# Patient Record
Sex: FEMALE | Race: WHITE | Employment: FULL TIME | URBAN - METROPOLITAN AREA
[De-identification: names, ages, dates, MRNs, and addresses within clinical notes are randomized per-mention and may not be internally consistent; named-entity substitution may affect disease eponyms.]

---

## 2019-10-17 ENCOUNTER — APPOINTMENT (OUTPATIENT)
Dept: CT IMAGING | Age: 34
End: 2019-10-17
Attending: STUDENT IN AN ORGANIZED HEALTH CARE EDUCATION/TRAINING PROGRAM
Payer: COMMERCIAL

## 2019-10-17 ENCOUNTER — APPOINTMENT (OUTPATIENT)
Dept: GENERAL RADIOLOGY | Age: 34
End: 2019-10-17
Attending: STUDENT IN AN ORGANIZED HEALTH CARE EDUCATION/TRAINING PROGRAM
Payer: COMMERCIAL

## 2019-10-17 ENCOUNTER — HOSPITAL ENCOUNTER (EMERGENCY)
Age: 34
Discharge: HOME | End: 2019-10-17
Attending: STUDENT IN AN ORGANIZED HEALTH CARE EDUCATION/TRAINING PROGRAM
Payer: COMMERCIAL

## 2019-10-17 VITALS
HEIGHT: 60 IN | HEART RATE: 95 BPM | RESPIRATION RATE: 18 BRPM | DIASTOLIC BLOOD PRESSURE: 68 MMHG | BODY MASS INDEX: 25.52 KG/M2 | WEIGHT: 130 LBS | TEMPERATURE: 97.8 F | SYSTOLIC BLOOD PRESSURE: 117 MMHG | OXYGEN SATURATION: 98 %

## 2019-10-17 DIAGNOSIS — J45.41 MODERATE PERSISTENT ASTHMATIC BRONCHITIS WITH ACUTE EXACERBATION: Primary | ICD-10-CM

## 2019-10-17 LAB
ALBUMIN SERPL-MCNC: 3.8 G/DL (ref 3.5–5)
ALBUMIN/GLOB SERPL: 0.9 {RATIO} (ref 1.1–2.2)
ALP SERPL-CCNC: 41 U/L (ref 45–117)
ALT SERPL-CCNC: 12 U/L (ref 12–78)
ANION GAP SERPL CALC-SCNC: 9 MMOL/L (ref 5–15)
APPEARANCE UR: CLEAR
AST SERPL-CCNC: 9 U/L (ref 15–37)
BACTERIA URNS QL MICRO: ABNORMAL /HPF
BASOPHILS # BLD: 0 K/UL (ref 0–0.1)
BASOPHILS NFR BLD: 0 % (ref 0–1)
BILIRUB SERPL-MCNC: 0.4 MG/DL (ref 0.2–1)
BILIRUB UR QL: NEGATIVE
BNP SERPL-MCNC: 27 PG/ML
BUN SERPL-MCNC: 13 MG/DL (ref 6–20)
BUN/CREAT SERPL: 17 (ref 12–20)
CALCIUM SERPL-MCNC: 8.8 MG/DL (ref 8.5–10.1)
CHLORIDE SERPL-SCNC: 108 MMOL/L (ref 97–108)
CO2 SERPL-SCNC: 22 MMOL/L (ref 21–32)
COLOR UR: ABNORMAL
COMMENT, HOLDF: NORMAL
CREAT SERPL-MCNC: 0.78 MG/DL (ref 0.55–1.02)
D DIMER PPP FEU-MCNC: <0.19 MG/L FEU (ref 0–0.65)
DIFFERENTIAL METHOD BLD: ABNORMAL
EOSINOPHIL # BLD: 0 K/UL (ref 0–0.4)
EOSINOPHIL NFR BLD: 0 % (ref 0–7)
EPITH CASTS URNS QL MICRO: ABNORMAL /LPF
ERYTHROCYTE [DISTWIDTH] IN BLOOD BY AUTOMATED COUNT: 12.5 % (ref 11.5–14.5)
GLOBULIN SER CALC-MCNC: 4.1 G/DL (ref 2–4)
GLUCOSE SERPL-MCNC: 107 MG/DL (ref 65–100)
GLUCOSE UR STRIP.AUTO-MCNC: NEGATIVE MG/DL
HCG UR QL: NEGATIVE
HCT VFR BLD AUTO: 44.1 % (ref 35–47)
HGB BLD-MCNC: 14.9 G/DL (ref 11.5–16)
HGB UR QL STRIP: ABNORMAL
HYALINE CASTS URNS QL MICRO: ABNORMAL /LPF (ref 0–5)
IMM GRANULOCYTES # BLD AUTO: 0.1 K/UL (ref 0–0.04)
IMM GRANULOCYTES NFR BLD AUTO: 1 % (ref 0–0.5)
KETONES UR QL STRIP.AUTO: ABNORMAL MG/DL
LACTATE BLD-SCNC: 2.48 MMOL/L (ref 0.4–2)
LACTATE BLD-SCNC: 2.94 MMOL/L (ref 0.4–2)
LEUKOCYTE ESTERASE UR QL STRIP.AUTO: ABNORMAL
LYMPHOCYTES # BLD: 2.2 K/UL (ref 0.8–3.5)
LYMPHOCYTES NFR BLD: 15 % (ref 12–49)
MCH RBC QN AUTO: 29.4 PG (ref 26–34)
MCHC RBC AUTO-ENTMCNC: 33.8 G/DL (ref 30–36.5)
MCV RBC AUTO: 87.2 FL (ref 80–99)
MONOCYTES # BLD: 0.7 K/UL (ref 0–1)
MONOCYTES NFR BLD: 5 % (ref 5–13)
NEUTS SEG # BLD: 11.4 K/UL (ref 1.8–8)
NEUTS SEG NFR BLD: 79 % (ref 32–75)
NITRITE UR QL STRIP.AUTO: NEGATIVE
NRBC # BLD: 0 K/UL (ref 0–0.01)
NRBC BLD-RTO: 0 PER 100 WBC
PH UR STRIP: 7 [PH] (ref 5–8)
PLATELET # BLD AUTO: 487 K/UL (ref 150–400)
PMV BLD AUTO: 9.4 FL (ref 8.9–12.9)
POTASSIUM SERPL-SCNC: 3.1 MMOL/L (ref 3.5–5.1)
PROT SERPL-MCNC: 7.9 G/DL (ref 6.4–8.2)
PROT UR STRIP-MCNC: NEGATIVE MG/DL
RBC # BLD AUTO: 5.06 M/UL (ref 3.8–5.2)
RBC #/AREA URNS HPF: ABNORMAL /HPF (ref 0–5)
SAMPLES BEING HELD,HOLD: NORMAL
SODIUM SERPL-SCNC: 139 MMOL/L (ref 136–145)
SP GR UR REFRACTOMETRY: 1.01 (ref 1–1.03)
TROPONIN I SERPL-MCNC: <0.05 NG/ML
UA: UC IF INDICATED,UAUC: ABNORMAL
UROBILINOGEN UR QL STRIP.AUTO: 0.2 EU/DL (ref 0.2–1)
WBC # BLD AUTO: 14.4 K/UL (ref 3.6–11)
WBC URNS QL MICRO: ABNORMAL /HPF (ref 0–4)

## 2019-10-17 PROCEDURE — 71046 X-RAY EXAM CHEST 2 VIEWS: CPT

## 2019-10-17 PROCEDURE — 74011636320 HC RX REV CODE- 636/320: Performed by: RADIOLOGY

## 2019-10-17 PROCEDURE — 81025 URINE PREGNANCY TEST: CPT

## 2019-10-17 PROCEDURE — 83605 ASSAY OF LACTIC ACID: CPT

## 2019-10-17 PROCEDURE — 96366 THER/PROPH/DIAG IV INF ADDON: CPT

## 2019-10-17 PROCEDURE — 85379 FIBRIN DEGRADATION QUANT: CPT

## 2019-10-17 PROCEDURE — 99285 EMERGENCY DEPT VISIT HI MDM: CPT

## 2019-10-17 PROCEDURE — 93005 ELECTROCARDIOGRAM TRACING: CPT

## 2019-10-17 PROCEDURE — 85025 COMPLETE CBC W/AUTO DIFF WBC: CPT

## 2019-10-17 PROCEDURE — 81001 URINALYSIS AUTO W/SCOPE: CPT

## 2019-10-17 PROCEDURE — 74011000250 HC RX REV CODE- 250: Performed by: STUDENT IN AN ORGANIZED HEALTH CARE EDUCATION/TRAINING PROGRAM

## 2019-10-17 PROCEDURE — 71275 CT ANGIOGRAPHY CHEST: CPT

## 2019-10-17 PROCEDURE — 87040 BLOOD CULTURE FOR BACTERIA: CPT

## 2019-10-17 PROCEDURE — 74011250636 HC RX REV CODE- 250/636: Performed by: STUDENT IN AN ORGANIZED HEALTH CARE EDUCATION/TRAINING PROGRAM

## 2019-10-17 PROCEDURE — 77030013140 HC MSK NEB VYRM -A

## 2019-10-17 PROCEDURE — 83880 ASSAY OF NATRIURETIC PEPTIDE: CPT

## 2019-10-17 PROCEDURE — 84484 ASSAY OF TROPONIN QUANT: CPT

## 2019-10-17 PROCEDURE — 96365 THER/PROPH/DIAG IV INF INIT: CPT

## 2019-10-17 PROCEDURE — 87086 URINE CULTURE/COLONY COUNT: CPT

## 2019-10-17 PROCEDURE — 74011250637 HC RX REV CODE- 250/637: Performed by: STUDENT IN AN ORGANIZED HEALTH CARE EDUCATION/TRAINING PROGRAM

## 2019-10-17 PROCEDURE — 74011000250 HC RX REV CODE- 250

## 2019-10-17 PROCEDURE — 80053 COMPREHEN METABOLIC PANEL: CPT

## 2019-10-17 PROCEDURE — 36415 COLL VENOUS BLD VENIPUNCTURE: CPT

## 2019-10-17 PROCEDURE — 96375 TX/PRO/DX INJ NEW DRUG ADDON: CPT

## 2019-10-17 RX ORDER — IPRATROPIUM BROMIDE AND ALBUTEROL SULFATE 2.5; .5 MG/3ML; MG/3ML
3 SOLUTION RESPIRATORY (INHALATION)
Status: COMPLETED | OUTPATIENT
Start: 2019-10-17 | End: 2019-10-17

## 2019-10-17 RX ORDER — POTASSIUM CHLORIDE 7.45 MG/ML
10 INJECTION INTRAVENOUS
Status: DISCONTINUED | OUTPATIENT
Start: 2019-10-17 | End: 2019-10-17 | Stop reason: HOSPADM

## 2019-10-17 RX ORDER — IPRATROPIUM BROMIDE AND ALBUTEROL SULFATE 2.5; .5 MG/3ML; MG/3ML
SOLUTION RESPIRATORY (INHALATION)
Status: COMPLETED
Start: 2019-10-17 | End: 2019-10-17

## 2019-10-17 RX ORDER — POTASSIUM CHLORIDE 750 MG/1
20 TABLET, FILM COATED, EXTENDED RELEASE ORAL
Status: COMPLETED | OUTPATIENT
Start: 2019-10-17 | End: 2019-10-17

## 2019-10-17 RX ORDER — BENZONATATE 100 MG/1
200 CAPSULE ORAL
Qty: 30 CAP | Refills: 0 | Status: SHIPPED | OUTPATIENT
Start: 2019-10-17 | End: 2019-10-24

## 2019-10-17 RX ADMIN — METHYLPREDNISOLONE SODIUM SUCCINATE 125 MG: 125 INJECTION, POWDER, FOR SOLUTION INTRAMUSCULAR; INTRAVENOUS at 11:48

## 2019-10-17 RX ADMIN — POTASSIUM CHLORIDE 10 MEQ: 7.46 INJECTION, SOLUTION INTRAVENOUS at 12:40

## 2019-10-17 RX ADMIN — SODIUM CHLORIDE 1000 ML: 900 INJECTION, SOLUTION INTRAVENOUS at 11:25

## 2019-10-17 RX ADMIN — IPRATROPIUM BROMIDE AND ALBUTEROL SULFATE 3 ML: 2.5; .5 SOLUTION RESPIRATORY (INHALATION) at 11:47

## 2019-10-17 RX ADMIN — IPRATROPIUM BROMIDE AND ALBUTEROL SULFATE 3 ML: .5; 3 SOLUTION RESPIRATORY (INHALATION) at 11:47

## 2019-10-17 RX ADMIN — IPRATROPIUM BROMIDE AND ALBUTEROL SULFATE 3 ML: .5; 3 SOLUTION RESPIRATORY (INHALATION) at 12:07

## 2019-10-17 RX ADMIN — SODIUM CHLORIDE 1000 ML: 900 INJECTION, SOLUTION INTRAVENOUS at 12:36

## 2019-10-17 RX ADMIN — IPRATROPIUM BROMIDE AND ALBUTEROL SULFATE 3 ML: .5; 3 SOLUTION RESPIRATORY (INHALATION) at 10:16

## 2019-10-17 RX ADMIN — IOPAMIDOL 80 ML: 755 INJECTION, SOLUTION INTRAVENOUS at 12:58

## 2019-10-17 RX ADMIN — POTASSIUM CHLORIDE 20 MEQ: 750 TABLET, EXTENDED RELEASE ORAL at 13:55

## 2019-10-17 NOTE — ED NOTES
Patient given discharge instructions, and 2 Rx. Patient able to verbalize understanding. Patient ambulatory from ED to home with boyfriend.

## 2019-10-17 NOTE — ED PROVIDER NOTES
27 y.o. female with past medical history significant for asthma who presents from PCP office with chief complaint of shortness of breath. Pt complains of shortness of breath on exertion with associated cough, and subjective fever for the past 2 weeks. Pt states shortness of breath was been progressively worsening and that she was unable to go to work for the past few days due to symptoms. Pt states she was evaluated at [facility] 4 days ago, given an epi shot, and prescribed Levaquin and a medrol jenna which she has been taking with no relief. Pt states she has had to stay in bed for the past 3 days due to difficulty breathing on exertion. Pt states she tried to walk at work today, but her shortness of breath was worse so she went back to her PCP and was referred to the ED. Pt notes chest pressure with shortness of breath today. Pt states she has a rescue inhaler. 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 Pt denies hemoptysis, abdominal pain, dysuria, or leg swelling. Pt denies recent travel, or surgery. There are no other acute medical concerns at this time. Social hx: Never Smoker. . PCP: No primary care provider on file. Note written by Jj George. Lizett Jiménez, as dictated by Miquel Alberts MD 10:42 AM      The history is provided by the patient and a relative. No past medical history on file. No past surgical history on file. No family history on file.     Social History     Socioeconomic History    Marital status: Not on file     Spouse name: Not on file    Number of children: Not on file    Years of education: Not on file    Highest education level: Not on file   Occupational History    Not on file   Social Needs    Financial resource strain: Not on file    Food insecurity:     Worry: Not on file     Inability: Not on file    Transportation needs:     Medical: Not on file     Non-medical: Not on file   Tobacco Use    Smoking status: Not on file   Substance and Sexual Activity    Alcohol use: Not on file    Drug use: Not on file    Sexual activity: Not on file   Lifestyle    Physical activity:     Days per week: Not on file     Minutes per session: Not on file    Stress: Not on file   Relationships    Social connections:     Talks on phone: Not on file     Gets together: Not on file     Attends Protestant service: Not on file     Active member of club or organization: Not on file     Attends meetings of clubs or organizations: Not on file     Relationship status: Not on file    Intimate partner violence:     Fear of current or ex partner: Not on file     Emotionally abused: Not on file     Physically abused: Not on file     Forced sexual activity: Not on file   Other Topics Concern    Not on file   Social History Narrative    Not on file         ALLERGIES: Patient has no known allergies. Review of Systems   Constitutional: Positive for fever (Subjective. ). Negative for chills. HENT: Negative for sore throat. Respiratory: Positive for cough and shortness of breath. Cardiovascular: Positive for chest pain. Gastrointestinal: Negative for abdominal pain, nausea and vomiting. Genitourinary: Negative for dysuria. Musculoskeletal: Negative for back pain. Skin: Negative for rash. Neurological: Negative for syncope and headaches. Psychiatric/Behavioral: Negative for confusion. All other systems reviewed and are negative. Vitals:    10/17/19 1022   BP: (!) 135/98   Pulse: (!) 105   Resp: 30   Temp: 97.8 °F (36.6 °C)   SpO2: 100%   Weight: 59 kg (130 lb)   Height: 5' (1.524 m)            Physical Exam   Constitutional: She is oriented to person, place, and time. She appears well-developed. She appears distressed. HENT:   Head: Normocephalic and atraumatic. Eyes: Pupils are equal, round, and reactive to light. Conjunctivae and EOM are normal.   Neck: Normal range of motion. Neck supple. Cardiovascular: Normal rate, regular rhythm and normal heart sounds. No murmur heard. Pulmonary/Chest: She is in respiratory distress (Mild. ). She has no wheezes. She has no rales. Coarse breath sounds bilaterally. Abdominal: Soft. Bowel sounds are normal. She exhibits no distension. There is no tenderness. There is no rebound. Musculoskeletal: Normal range of motion. She exhibits no edema. Neurological: She is alert and oriented to person, place, and time. No cranial nerve deficit. She exhibits normal muscle tone. Coordination normal.   Skin: Skin is warm and dry. No rash noted. Psychiatric: She has a normal mood and affect. Her behavior is normal.   Nursing note and vitals reviewed. Note written by Cheryle Grego. Fabrice Schneider, as dictated by Sb Sewell MD 10:45 AM      MDM       Procedures  ED EKG interpretation:  Rhythm: normal sinus rhythm; and regular . Rate (approx.): 100; Axis: normal; ST/T wave: normal.  Note written by Sabrina Schneider, as dictated by Sb Sewell MD 10:18 AM      1:48 PM  Patient's results have been reviewed with them. Patient and/or family have verbally conveyed their understanding and agreement of the patient's signs, symptoms, diagnosis, treatment and prognosis and additionally agree to follow up as recommended or return to the Emergency Room should their condition change prior to follow-up. Discharge instructions have also been provided to the patient with some educational information regarding their diagnosis as well a list of reasons why they would want to return to the ER prior to their follow-up appointment should their condition change.

## 2019-10-17 NOTE — ED TRIAGE NOTES
Pt c/o ongoing SOB with cough x 2 weeks. +intermittent fevers. Pt appears uncomforable and is speaking in 2 word sentances. Pt brought directly to ER room for treatment. +increased RR, +fine expiratory wheezing. Duo-neb breathing treatment initiated.  [facility]en at [facility].

## 2019-10-18 LAB
ATRIAL RATE: 100 BPM
BACTERIA SPEC CULT: NORMAL
CALCULATED P AXIS, ECG09: 60 DEGREES
CALCULATED R AXIS, ECG10: 75 DEGREES
CALCULATED T AXIS, ECG11: 52 DEGREES
CC UR VC: NORMAL
DIAGNOSIS, 93000: NORMAL
P-R INTERVAL, ECG05: 114 MS
Q-T INTERVAL, ECG07: 334 MS
QRS DURATION, ECG06: 78 MS
QTC CALCULATION (BEZET), ECG08: 430 MS
SERVICE CMNT-IMP: NORMAL
VENTRICULAR RATE, ECG03: 100 BPM

## 2019-10-23 LAB
BACTERIA SPEC CULT: NORMAL
BACTERIA SPEC CULT: NORMAL
SERVICE CMNT-IMP: NORMAL
SERVICE CMNT-IMP: NORMAL

## 2019-10-28 ENCOUNTER — OFFICE VISIT (OUTPATIENT)
Dept: CARDIOLOGY CLINIC | Age: 34
End: 2019-10-28

## 2019-10-28 VITALS — HEART RATE: 120 BPM | DIASTOLIC BLOOD PRESSURE: 80 MMHG | SYSTOLIC BLOOD PRESSURE: 118 MMHG | WEIGHT: 131.4 LBS

## 2019-10-28 DIAGNOSIS — R06.02 SOB (SHORTNESS OF BREATH): ICD-10-CM

## 2019-10-28 DIAGNOSIS — R00.0 TACHYCARDIA: Primary | ICD-10-CM

## 2019-10-28 DIAGNOSIS — R00.2 PALPITATIONS: ICD-10-CM

## 2019-10-28 DIAGNOSIS — R07.9 CHEST PAIN, UNSPECIFIED TYPE: ICD-10-CM

## 2019-10-28 RX ORDER — TRIAMCINOLONE ACETONIDE 55 UG/1
2 SPRAY, METERED NASAL DAILY
COMMUNITY

## 2019-10-28 RX ORDER — LEVALBUTEROL 1.25 MG/.5ML
1.25 SOLUTION, CONCENTRATE RESPIRATORY (INHALATION)
Status: ON HOLD | COMMUNITY
End: 2021-12-07

## 2019-10-28 RX ORDER — BUDESONIDE AND FORMOTEROL FUMARATE DIHYDRATE 80; 4.5 UG/1; UG/1
2 AEROSOL RESPIRATORY (INHALATION) 2 TIMES DAILY
Status: ON HOLD | COMMUNITY
End: 2021-12-07

## 2019-10-28 RX ORDER — FAMOTIDINE 10 MG/1
40 TABLET ORAL DAILY
COMMUNITY

## 2019-10-28 RX ORDER — PANTOPRAZOLE SODIUM 40 MG/1
40 TABLET, DELAYED RELEASE ORAL 2 TIMES DAILY
COMMUNITY

## 2019-10-28 RX ORDER — NORGESTIMATE AND ETHINYL ESTRADIOL 0.25-0.035
1 KIT ORAL DAILY
COMMUNITY

## 2019-10-28 RX ORDER — ZAFIRLUKAST 20 MG/1
20 TABLET, FILM COATED ORAL 2 TIMES DAILY
COMMUNITY

## 2019-10-28 NOTE — PROGRESS NOTES
Reason for Consult: Palpitations. Referring: No primary care provider on file. HPI: Deonna Alfonso is a 27 y.o. female with past medical history significant for tonsillectomy is here for evaluation of symptoms of palpitations. Symptoms started on October 17, 2019. She is a schoolteacher and teaches first grade and was having difficulty with her speech because of shortness of breath. This has been going on for past 6 to 7 years and typically occurs when the school started in September and would last for a few days to few weeks. However this time it was unusual that and also was associated with symptoms of palpitations. Symptoms described as heart racing with minimal activity such as moving from resting position to going to bathroom more going to the kitchen. Her heart rate would jump up to 130 bpm which she recorded using a pulse ox at home. Symptoms of mild chest discomfort anterior chest wall nonradiating aching in nature has been also associated with palpitations. There is no symptoms of lightheadedness or dizziness. Initially when she was seen at the urgent care she was referred for further evaluation and the [facility] ER where she was ruled out for pulmonary embolism. Her CT scan of the chest was negative. EKG at that time demonstrated a heart rate of 100 bpm.  Her pulse ox was normal.  She thereafter followed up with her primary care physician and was thought to have possible reactive airways and was prescribed bronchodilators. She formally saw the allergy physicians today and has been provisionally diagnosed with seasonal allergy disorder and has been given anti-allergy medications along with bronchodilators. She also underwent PFTs today which were normal.  She is here today primarily because of her palpitations and chest pressure sensation. No previous cardiac history. No history of tobacco smoking. No family history of heart disease. No significant past medical history.     Only significant past surgical history is tonsillectomy. She does not smoke tobacco.    EKG from October 17, 2019 personally reviewed EKG demonstrated normal sinus rhythm 100 bpm with normal axis with normal intervals ST segment. EKG from primary care physician office from October 21, 2019 was also personally reviewed. EKG demonstrated sinus rhythm with heart rate of 94 bpm with normal axis with normal intervals. Lab results including those from October 17 in the ER as well as from the urgent care visit were also personally reviewed. Her NT proBNP is normal at 27. Troponins were negative. Plan:    1. Tachycardia: Likely reactive to her seasonal allergy with respiratory symptoms however will need to rule out if there is any organic cardiac cause. She is currently not taking any medications that could affect significant heart rate issues. Will further evaluate with an echocardiogram.  May need a 24-hour Holter monitor if symptoms does not resolve with new allergy medications which she is going to start today. 2.  Chest pressure: This is likely a sensation she is getting from tachycardia. We will continue to monitor at this time. Does not appear to be CAD related. No classic symptoms of angina.              Social History     Socioeconomic History    Marital status: Not on file     Spouse name: Not on file    Number of children: Not on file    Years of education: Not on file    Highest education level: Not on file   Occupational History    Not on file   Social Needs    Financial resource strain: Not on file    Food insecurity:     Worry: Not on file     Inability: Not on file    Transportation needs:     Medical: Not on file     Non-medical: Not on file   Tobacco Use    Smoking status: Never Smoker    Smokeless tobacco: Never Used   Substance and Sexual Activity    Alcohol use: Not on file    Drug use: Not on file    Sexual activity: Not on file   Lifestyle    Physical activity:     Days per week: Not on file     Minutes per session: Not on file    Stress: Not on file   Relationships    Social connections:     Talks on phone: Not on file     Gets together: Not on file     Attends Methodist service: Not on file     Active member of club or organization: Not on file     Attends meetings of clubs or organizations: Not on file     Relationship status: Not on file    Intimate partner violence:     Fear of current or ex partner: Not on file     Emotionally abused: Not on file     Physically abused: Not on file     Forced sexual activity: Not on file   Other Topics Concern    Not on file   Social History Narrative    Not on file         No Known Allergies              ROS:  12 point review of systems was performed.  All negative except for HPI     Physical Exam:  Visit Vitals  Wt 131 lb 6.4 oz (59.6 kg)       Gen:  Well-developed, well-nourished, in no acute distress  HEENT:  Pink conjunctivae, PERRL, hearing intact to voice, moist mucous membranes  Neck:  Supple, without masses, thyroid non-tender  Resp:  No accessory muscle use, clear breath sounds without wheezes rales or rhonchi  Card:  No murmurs, normal S1, S2 without thrills, bruits or peripheral edema  Abd:  Soft, non-tender, non-distended, normoactive bowel sounds are present, no palpable organomegaly and no detectable hernias  Lymph:  No cervical or inguinal adenopathy  Musc:  No cyanosis or clubbing  Skin:  No rashes or ulcers, skin turgor is good  Neuro:  Cranial nerves are grossly intact, no focal motor weakness, follows commands appropriately  Psych:  Good insight, oriented to person, place and time, alert     Labs:     No results found for: WBC, WBCT, WBCPOC, HGB, HGBPOC, HCT, HCTPOC, PLT, PLTPOC, MCV, MCVPOC, HGBEXT, HCTEXT, PLTEXT  No results found for: HBA1C, [id number], HGBE8, GLU, GESTF, GLUCPOC, MCACR, MCA1, MCA2, MCA3, MCAU, LDL, LDLC, DLDLP, FINA, CREAPOC, ACREA, CREA, REFC3, REFC4, [id number]   No results found for: CHOL, CHOLPOCT, HDL, LDL, LDLC, LDLCPOC, LDLCEXT, TRIGL, TGLPOCT, CHHD, CHHDX  No results found for: GPT, ALTPOC, ALT, SGOT, ASTPOC, GGT, AP, APIT, APX, CBIL, TBIL, TBILI, ALB, ALBPOC, TP, NH3, NH4, INR, INREXT, PTP, PTINR, PTEXT, PLT, PLTPOC, HCABQL, HBSAG, AFP, INREXT, PTEXT, PLTEXT  No results found for: INR, INREXT, PTMR, PTP, PT1, PT2, INREXT   No results found for: GFRNA, GFRNAPOC, GFRAA, GFRAAPOC, CREA, CREAPOC, BUN, IBUN, BUNPOC, NA, NAPOC, K, KPOCT, CL, CLPOC, CO2, CO2POC, MG, PHOS, ALBEU, PTH, PTHILT, EPO  No results found for: PSA, Booker Karin, PSA3, PSAR3, [id number], [id number], PSALT  No results found for: TSH, TSH2, TSH3, TSHP, TSHELE, TSHEXT, TT3, T3U, T3UP, FRT3, FT3, FT4, FT4P, T4, T4P, FT4T, TT7, TSHEXT   No results found for: GLU, GLUCPOC   No results found for: CPK, RCK1, RCK2, RCK3, RCK4, CKMB, CKNDX, CKND1, TROPT, TROIQ, BNPP, BNP   No results found for: BNP, BNPP, BNPPPOC, XBNPT, BNPNT   No results found for: NA, K, CL, CO2, AGAP, GLU, BUN, CREA, BUCR, GFRAA, GFRNA, CA, GFRAA   No results found for: NA, K, CL, CO2, AGAP, GLU, BUN, CREA, BUCR, GFRAA, GFRNA, CA, TBIL, TBILI, GPT, ALT, SGOT, AP, TP, ALB, GLOB, AGRAT   No results found for: HBA1C, [id number], HGBE8, [id number], [id number]      No results for input(s): CPK, CKMB, TROIQ in the last 72 hours. No lab exists for component: CKQMB, CPKMB        Problem List:     Problem List  Date Reviewed: 10/28/2019    None            Cal Bustos MD, [facility]

## 2021-06-17 ENCOUNTER — TRANSCRIBE ORDER (OUTPATIENT)
Dept: SCHEDULING | Age: 36
End: 2021-06-17

## 2021-06-17 DIAGNOSIS — R05.3 CHRONIC COUGH: Primary | ICD-10-CM

## 2021-07-01 ENCOUNTER — HOSPITAL ENCOUNTER (OUTPATIENT)
Dept: CT IMAGING | Age: 36
Discharge: HOME | End: 2021-07-01
Attending: INTERNAL MEDICINE
Payer: COMMERCIAL

## 2021-07-01 DIAGNOSIS — R05.3 CHRONIC COUGH: ICD-10-CM

## 2021-07-01 PROCEDURE — 71250 CT THORAX DX C-: CPT

## 2021-12-06 ENCOUNTER — APPOINTMENT (OUTPATIENT)
Dept: GENERAL RADIOLOGY | Age: 36
End: 2021-12-06
Attending: STUDENT IN AN ORGANIZED HEALTH CARE EDUCATION/TRAINING PROGRAM
Payer: COMMERCIAL

## 2021-12-06 ENCOUNTER — APPOINTMENT (OUTPATIENT)
Dept: CT IMAGING | Age: 36
End: 2021-12-06
Attending: EMERGENCY MEDICINE
Payer: COMMERCIAL

## 2021-12-06 ENCOUNTER — APPOINTMENT (OUTPATIENT)
Dept: GENERAL RADIOLOGY | Age: 36
End: 2021-12-06
Attending: EMERGENCY MEDICINE
Payer: COMMERCIAL

## 2021-12-06 ENCOUNTER — HOSPITAL ENCOUNTER (OUTPATIENT)
Age: 36
Setting detail: OBSERVATION
Discharge: HOME | End: 2021-12-08
Attending: EMERGENCY MEDICINE | Admitting: INTERNAL MEDICINE
Payer: COMMERCIAL

## 2021-12-06 DIAGNOSIS — J45.41 MODERATE PERSISTENT ASTHMA WITH ACUTE EXACERBATION: Primary | ICD-10-CM

## 2021-12-06 DIAGNOSIS — R06.03 RESPIRATORY DISTRESS: ICD-10-CM

## 2021-12-06 PROBLEM — J45.909 ASTHMA: Status: ACTIVE | Noted: 2021-12-06

## 2021-12-06 PROBLEM — R06.82 TACHYPNEA: Status: ACTIVE | Noted: 2021-12-06

## 2021-12-06 PROBLEM — R06.00 DYSPNEA: Status: ACTIVE | Noted: 2021-12-06

## 2021-12-06 PROBLEM — D72.829 LEUKOCYTOSIS: Status: ACTIVE | Noted: 2021-12-06

## 2021-12-06 LAB
ALBUMIN SERPL-MCNC: 3.6 G/DL (ref 3.5–5)
ALBUMIN/GLOB SERPL: 1 {RATIO} (ref 1.1–2.2)
ALP SERPL-CCNC: 42 U/L (ref 45–117)
ALT SERPL-CCNC: 15 U/L (ref 12–78)
AMPHET UR QL SCN: NEGATIVE
ANION GAP SERPL CALC-SCNC: 9 MMOL/L (ref 5–15)
APPEARANCE UR: CLEAR
AST SERPL-CCNC: 12 U/L (ref 15–37)
BACTERIA URNS QL MICRO: NEGATIVE /HPF
BARBITURATES UR QL SCN: NEGATIVE
BASOPHILS # BLD: 0 K/UL (ref 0–0.1)
BASOPHILS NFR BLD: 0 % (ref 0–1)
BENZODIAZ UR QL: NEGATIVE
BILIRUB SERPL-MCNC: 0.4 MG/DL (ref 0.2–1)
BILIRUB UR QL: NEGATIVE
BUN SERPL-MCNC: 15 MG/DL (ref 6–20)
BUN/CREAT SERPL: 17 (ref 12–20)
CALCIUM SERPL-MCNC: 9.1 MG/DL (ref 8.5–10.1)
CANNABINOIDS UR QL SCN: NEGATIVE
CHLORIDE SERPL-SCNC: 107 MMOL/L (ref 97–108)
CO2 SERPL-SCNC: 22 MMOL/L (ref 21–32)
COCAINE UR QL SCN: NEGATIVE
COLOR UR: NORMAL
COVID-19 RAPID TEST, COVR: NOT DETECTED
CREAT SERPL-MCNC: 0.86 MG/DL (ref 0.55–1.02)
DIFFERENTIAL METHOD BLD: ABNORMAL
DRUG SCRN COMMENT,DRGCM: NORMAL
EOSINOPHIL # BLD: 0 K/UL (ref 0–0.4)
EOSINOPHIL NFR BLD: 0 % (ref 0–7)
EPITH CASTS URNS QL MICRO: NORMAL /LPF
ERYTHROCYTE [DISTWIDTH] IN BLOOD BY AUTOMATED COUNT: 13.3 % (ref 11.5–14.5)
ETHANOL SERPL-MCNC: <10 MG/DL
GLOBULIN SER CALC-MCNC: 3.7 G/DL (ref 2–4)
GLUCOSE SERPL-MCNC: 117 MG/DL (ref 65–100)
GLUCOSE UR STRIP.AUTO-MCNC: NEGATIVE MG/DL
HCT VFR BLD AUTO: 42.5 % (ref 35–47)
HGB BLD-MCNC: 14.4 G/DL (ref 11.5–16)
HGB UR QL STRIP: NEGATIVE
HYALINE CASTS URNS QL MICRO: NORMAL /LPF (ref 0–5)
IMM GRANULOCYTES # BLD AUTO: 0.3 K/UL (ref 0–0.04)
IMM GRANULOCYTES NFR BLD AUTO: 2 % (ref 0–0.5)
KETONES UR QL STRIP.AUTO: NEGATIVE MG/DL
LEUKOCYTE ESTERASE UR QL STRIP.AUTO: NEGATIVE
LYMPHOCYTES # BLD: 1.4 K/UL (ref 0.8–3.5)
LYMPHOCYTES NFR BLD: 10 % (ref 12–49)
MCH RBC QN AUTO: 28.5 PG (ref 26–34)
MCHC RBC AUTO-ENTMCNC: 33.9 G/DL (ref 30–36.5)
MCV RBC AUTO: 84.2 FL (ref 80–99)
METHADONE UR QL: NEGATIVE
MONOCYTES # BLD: 0.2 K/UL (ref 0–1)
MONOCYTES NFR BLD: 1 % (ref 5–13)
NEUTS SEG # BLD: 12.7 K/UL (ref 1.8–8)
NEUTS SEG NFR BLD: 87 % (ref 32–75)
NITRITE UR QL STRIP.AUTO: NEGATIVE
NRBC # BLD: 0 K/UL (ref 0–0.01)
NRBC BLD-RTO: 0 PER 100 WBC
OPIATES UR QL: NEGATIVE
PCP UR QL: NEGATIVE
PH UR STRIP: 7.5 [PH] (ref 5–8)
PLATELET # BLD AUTO: 511 K/UL (ref 150–400)
PMV BLD AUTO: 9.6 FL (ref 8.9–12.9)
POTASSIUM SERPL-SCNC: 3.9 MMOL/L (ref 3.5–5.1)
PROT SERPL-MCNC: 7.3 G/DL (ref 6.4–8.2)
PROT UR STRIP-MCNC: NEGATIVE MG/DL
RBC # BLD AUTO: 5.05 M/UL (ref 3.8–5.2)
RBC #/AREA URNS HPF: NORMAL /HPF (ref 0–5)
SODIUM SERPL-SCNC: 138 MMOL/L (ref 136–145)
SOURCE, COVRS: NORMAL
SP GR UR REFRACTOMETRY: 1.03 (ref 1–1.03)
TROPONIN-HIGH SENSITIVITY: 8 NG/L (ref 0–51)
UROBILINOGEN UR QL STRIP.AUTO: 0.2 EU/DL (ref 0.2–1)
WBC # BLD AUTO: 14.6 K/UL (ref 3.6–11)
WBC URNS QL MICRO: NORMAL /HPF (ref 0–4)

## 2021-12-06 PROCEDURE — 71045 X-RAY EXAM CHEST 1 VIEW: CPT

## 2021-12-06 PROCEDURE — 84484 ASSAY OF TROPONIN QUANT: CPT

## 2021-12-06 PROCEDURE — G0378 HOSPITAL OBSERVATION PER HR: HCPCS

## 2021-12-06 PROCEDURE — 74011250636 HC RX REV CODE- 250/636: Performed by: INTERNAL MEDICINE

## 2021-12-06 PROCEDURE — 74011000636 HC RX REV CODE- 636: Performed by: RADIOLOGY

## 2021-12-06 PROCEDURE — 93005 ELECTROCARDIOGRAM TRACING: CPT

## 2021-12-06 PROCEDURE — 71275 CT ANGIOGRAPHY CHEST: CPT

## 2021-12-06 PROCEDURE — 99284 EMERGENCY DEPT VISIT MOD MDM: CPT

## 2021-12-06 PROCEDURE — 85025 COMPLETE CBC W/AUTO DIFF WBC: CPT

## 2021-12-06 PROCEDURE — 96374 THER/PROPH/DIAG INJ IV PUSH: CPT

## 2021-12-06 PROCEDURE — 82077 ASSAY SPEC XCP UR&BREATH IA: CPT

## 2021-12-06 PROCEDURE — 80307 DRUG TEST PRSMV CHEM ANLYZR: CPT

## 2021-12-06 PROCEDURE — 96375 TX/PRO/DX INJ NEW DRUG ADDON: CPT

## 2021-12-06 PROCEDURE — 74011250637 HC RX REV CODE- 250/637: Performed by: INTERNAL MEDICINE

## 2021-12-06 PROCEDURE — 87635 SARS-COV-2 COVID-19 AMP PRB: CPT

## 2021-12-06 PROCEDURE — 80053 COMPREHEN METABOLIC PANEL: CPT

## 2021-12-06 PROCEDURE — 87086 URINE CULTURE/COLONY COUNT: CPT

## 2021-12-06 PROCEDURE — 74011250636 HC RX REV CODE- 250/636: Performed by: EMERGENCY MEDICINE

## 2021-12-06 PROCEDURE — 96372 THER/PROPH/DIAG INJ SC/IM: CPT

## 2021-12-06 PROCEDURE — 81001 URINALYSIS AUTO W/SCOPE: CPT

## 2021-12-06 PROCEDURE — 36415 COLL VENOUS BLD VENIPUNCTURE: CPT

## 2021-12-06 PROCEDURE — 96376 TX/PRO/DX INJ SAME DRUG ADON: CPT

## 2021-12-06 PROCEDURE — 0202U NFCT DS 22 TRGT SARS-COV-2: CPT

## 2021-12-06 RX ORDER — BUDESONIDE 0.5 MG/2ML
500 INHALANT ORAL
Status: DISCONTINUED | OUTPATIENT
Start: 2021-12-06 | End: 2021-12-06

## 2021-12-06 RX ORDER — ACETAMINOPHEN 325 MG/1
650 TABLET ORAL
Status: DISCONTINUED | OUTPATIENT
Start: 2021-12-06 | End: 2021-12-08 | Stop reason: HOSPADM

## 2021-12-06 RX ORDER — SODIUM CHLORIDE 0.9 % (FLUSH) 0.9 %
5-40 SYRINGE (ML) INJECTION EVERY 8 HOURS
Status: DISCONTINUED | OUTPATIENT
Start: 2021-12-06 | End: 2021-12-08 | Stop reason: HOSPADM

## 2021-12-06 RX ORDER — BUDESONIDE AND FORMOTEROL FUMARATE DIHYDRATE 80; 4.5 UG/1; UG/1
2 AEROSOL RESPIRATORY (INHALATION)
Status: DISCONTINUED | OUTPATIENT
Start: 2021-12-06 | End: 2021-12-07

## 2021-12-06 RX ORDER — ZAFIRLUKAST 20 MG/1
20 TABLET, FILM COATED ORAL 2 TIMES DAILY
Status: DISCONTINUED | OUTPATIENT
Start: 2021-12-06 | End: 2021-12-08 | Stop reason: HOSPADM

## 2021-12-06 RX ORDER — ONDANSETRON 4 MG/1
4 TABLET, ORALLY DISINTEGRATING ORAL
Status: DISCONTINUED | OUTPATIENT
Start: 2021-12-06 | End: 2021-12-08 | Stop reason: HOSPADM

## 2021-12-06 RX ORDER — ENOXAPARIN SODIUM 100 MG/ML
40 INJECTION SUBCUTANEOUS DAILY
Status: DISCONTINUED | OUTPATIENT
Start: 2021-12-06 | End: 2021-12-08 | Stop reason: HOSPADM

## 2021-12-06 RX ORDER — SODIUM CHLORIDE 0.9 % (FLUSH) 0.9 %
5-40 SYRINGE (ML) INJECTION AS NEEDED
Status: DISCONTINUED | OUTPATIENT
Start: 2021-12-06 | End: 2021-12-08 | Stop reason: HOSPADM

## 2021-12-06 RX ORDER — ONDANSETRON 2 MG/ML
4 INJECTION INTRAMUSCULAR; INTRAVENOUS
Status: DISCONTINUED | OUTPATIENT
Start: 2021-12-06 | End: 2021-12-08 | Stop reason: HOSPADM

## 2021-12-06 RX ORDER — IPRATROPIUM BROMIDE AND ALBUTEROL SULFATE 2.5; .5 MG/3ML; MG/3ML
3 SOLUTION RESPIRATORY (INHALATION)
Status: ACTIVE | OUTPATIENT
Start: 2021-12-06 | End: 2021-12-07

## 2021-12-06 RX ORDER — ACETAMINOPHEN 650 MG/1
650 SUPPOSITORY RECTAL
Status: DISCONTINUED | OUTPATIENT
Start: 2021-12-06 | End: 2021-12-08 | Stop reason: HOSPADM

## 2021-12-06 RX ORDER — FLUTICASONE PROPIONATE 50 MCG
2 SPRAY, SUSPENSION (ML) NASAL DAILY
Status: DISCONTINUED | OUTPATIENT
Start: 2021-12-07 | End: 2021-12-08 | Stop reason: HOSPADM

## 2021-12-06 RX ORDER — ARFORMOTEROL TARTRATE 15 UG/2ML
15 SOLUTION RESPIRATORY (INHALATION)
Status: DISCONTINUED | OUTPATIENT
Start: 2021-12-06 | End: 2021-12-06

## 2021-12-06 RX ORDER — PANTOPRAZOLE SODIUM 40 MG/1
40 TABLET, DELAYED RELEASE ORAL DAILY
Status: DISCONTINUED | OUTPATIENT
Start: 2021-12-07 | End: 2021-12-08 | Stop reason: HOSPADM

## 2021-12-06 RX ORDER — POLYETHYLENE GLYCOL 3350 17 G/17G
17 POWDER, FOR SOLUTION ORAL DAILY PRN
Status: DISCONTINUED | OUTPATIENT
Start: 2021-12-06 | End: 2021-12-08 | Stop reason: HOSPADM

## 2021-12-06 RX ADMIN — SODIUM CHLORIDE 1000 ML: 9 INJECTION, SOLUTION INTRAVENOUS at 16:34

## 2021-12-06 RX ADMIN — IOPAMIDOL 80 ML: 755 INJECTION, SOLUTION INTRAVENOUS at 14:21

## 2021-12-06 RX ADMIN — METHYLPREDNISOLONE SODIUM SUCCINATE 40 MG: 40 INJECTION, POWDER, FOR SOLUTION INTRAMUSCULAR; INTRAVENOUS at 23:11

## 2021-12-06 RX ADMIN — Medication 10 ML: at 23:13

## 2021-12-06 RX ADMIN — FAMOTIDINE 20 MG: 10 INJECTION, SOLUTION INTRAVENOUS at 23:13

## 2021-12-06 RX ADMIN — BUDESONIDE AND FORMOTEROL FUMARATE DIHYDRATE 2 PUFF: 80; 4.5 AEROSOL RESPIRATORY (INHALATION) at 23:12

## 2021-12-06 RX ADMIN — ENOXAPARIN SODIUM 40 MG: 40 INJECTION SUBCUTANEOUS at 23:12

## 2021-12-06 RX ADMIN — Medication 10 ML: at 16:35

## 2021-12-06 RX ADMIN — METHYLPREDNISOLONE SODIUM SUCCINATE 125 MG: 125 INJECTION, POWDER, FOR SOLUTION INTRAMUSCULAR; INTRAVENOUS at 16:34

## 2021-12-06 NOTE — CONSULTS
[facility]     Name: Harvey Burns MRN: [id number]   : 1985 Hospital: [address] [place] [address] Dr   Date: 2021        Impression Plan   1. Asthma exacerbation  2. GERD  3. Environmental allergies               · IV solumedrol  · Pulmicort/Brovana  · Duonebs  · Continue home famotidine/PPI  · OOB into chair as much as possible  · Rapid covid pending- low suspicion           Radiology  ( personally reviewed) CTA chest: no infiltrates, no PE   ABG No results for input(s): PHI, PO2I, PCO2I in the last 72 hours. Subjective     Cc: shortness of breath    38 yo with PMHx of asthma presenting for increasng shortness of breath. Sees Dr. Yeny House as outpatient and last week received IM steroid along with steroid taper, however did not improve. Denies sick contacts. GERD is controlled and allergies have been controlled with zafir lukast. As outpatient she is managed on Symbicort 160/Spiriva and xopenex. Non-smoker. Review of Systems:  A comprehensive review of systems was negative except for that written in the HPI. Past Medical History:   Diagnosis Date    Asthma     GERD (gastroesophageal reflux disease)       No past surgical history on file. Prior to Admission medications    Medication Sig Start Date End Date Taking? Authorizing Provider   zafirlukast (ACCOLATE) 20 mg tablet Take 20 mg by mouth two (2) times a day. Provider, Historical   pantoprazole (PROTONIX) 40 mg tablet Take 40 mg by mouth daily. Provider, Historical   famotidine (PEPCID) 10 mg tablet Take 10 mg by mouth daily. Provider, Historical   norgestimate-ethinyl estradiol (ORTHO-CYCLEN, SPRINTEC) 0.25-35 mg-mcg tab Take 1 Tab by mouth daily. Provider, Historical   levalbuterol (XOPENEX) 1.25 mg/0.5 mL nebu 1.25 mg by Nebulization route. Provider, Historical   budesonide-formoterol (SYMBICORT) 80-4.5 mcg/actuation HFAA Take 2 Puffs by inhalation two (2) times a day.     Provider, Historical triamcinolone (NASACORT) 55 mcg nasal inhaler 2 Sprays daily. Provider, Historical   guaiFENesin-dextromethorphan SR (MUCINEX DM) 600-30 mg per tablet Take 1 Tab by mouth two (2) times a day. Provider, Historical   albuterol sulfate 90 mcg/actuation aepb Take 2 Puffs by inhalation every four (4) hours as needed for Cough. 10/17/19   Jody Colin MD     Current Facility-Administered Medications   Medication Dose Route Frequency    sodium chloride 0.9 % bolus infusion 1,000 mL  1,000 mL IntraVENous ONCE    albuterol-ipratropium (DUO-NEB) 2.5 MG-0.5 MG/3 ML  3 mL Nebulization NOW    methylPREDNISolone (PF) (Solu-MEDROL) injection 125 mg  125 mg IntraVENous ONCE    methylPREDNISolone (PF) (SOLU-MEDROL) injection 40 mg  40 mg IntraVENous Q6H    arformoteroL (BROVANA) neb solution 15 mcg  15 mcg Nebulization BID RT    budesonide (PULMICORT) 500 mcg/2 ml nebulizer suspension  500 mcg Nebulization BID RT    sodium chloride (NS) flush 5-40 mL  5-40 mL IntraVENous Q8H    enoxaparin (LOVENOX) injection 40 mg  40 mg SubCUTAneous DAILY    [START ON 12/7/2021] pantoprazole (PROTONIX) tablet 40 mg  40 mg Oral DAILY    [START ON 12/7/2021] triamcinolone (NASACORT AQ) 55mcg/spray nasal spray  2 Spray Both Nostrils DAILY    zafirlukast (ACCOLATE) tablet 20 mg  20 mg Oral BID     No Known Allergies   Social History     Tobacco Use    Smoking status: Never Smoker    Smokeless tobacco: Never Used   Substance Use Topics    Alcohol use: Not on file      No family history on file. Laboratory: I have personally reviewed the critical care flowsheet and labs.      Recent Labs     12/06/21  1310   WBC 14.6*   HGB 14.4   HCT 42.5   *     Recent Labs     12/06/21  1310      K 3.9      CO2 22   *   BUN 15   CREA 0.86   CA 9.1   ALB 3.6   ALT 15       Objective:     Mode Rate Tidal Volume Pressure FiO2 PEEP                    Vital Signs:     TMAX(24)      Intake/Output:   Last shift: Last 3 shifts: No intake/output data recorded. [id number] intake or output data in the 24 hours ending 12/06/21 1557  EXAM:   GENERAL:  Mildly dyspnic with hoarse voice, HEENT:  PERRL, EOMI, no alar flaring or epistaxis, oral mucosa moist without cyanosis, NECK:  no jugular vein distention, no retractions, no thyromegaly or masses, LUNGS: decreased breath sounds billaterally, no w/r/r HEART:  Regular rate and rhythm with no MGR; no edema is present, ABDOMEN:  soft with no tenderness, bowel sounds present, EXTREMITIES:  warm with no cyanosis, SKIN:  no jaundice or ecchymosis and NEUROLOGIC:  alert and oriented, grossly non-focal    Florencia Aponte MD  [facility]

## 2021-12-06 NOTE — ED TRIAGE NOTES
Cough, SOB x1 week. Saw pulmonologist, prescribed steroids and antibiotics with no relief. Hx of asthma.   Pt vaccinated for covid

## 2021-12-06 NOTE — ED PROVIDER NOTES
Ms. Millie Jean Baptiste is a 43yo female who presents to the ER with complaints of SOB. She reports that she has been SOB for the last week. She was seen six days ago by her pulmonologist and was started on a steroid taper. She had been on 30mg daily for 3 days and she just finished 20mg daily for three days. She has been using her inhaler without much improvement in her symptoms. She had a negative covid test last week as well. She has been vaccinated for covid. She reports chest tightness but no chest pain. No changes with her urine or bowel movements. No swelling in her legs. She denies any other complaints. No past medical history on file. No past surgical history on file. No family history on file. Social History     Socioeconomic History    Marital status: SINGLE     Spouse name: Not on file    Number of children: Not on file    Years of education: Not on file    Highest education level: Not on file   Occupational History    Not on file   Tobacco Use    Smoking status: Never Smoker    Smokeless tobacco: Never Used   Substance and Sexual Activity    Alcohol use: Not on file    Drug use: Not on file    Sexual activity: Not on file   Other Topics Concern    Not on file   Social History Narrative    Not on file     Social Determinants of Health     Financial Resource Strain:     Difficulty of Paying Living Expenses: Not on file   Food Insecurity:     Worried About Running Out of Food in the Last Year: Not on file    Hugo of Food in the Last Year: Not on file   Transportation Needs:     Lack of Transportation (Medical): Not on file    Lack of Transportation (Non-Medical):  Not on file   Physical Activity:     Days of Exercise per Week: Not on file    Minutes of Exercise per Session: Not on file   Stress:     Feeling of Stress : Not on file   Social Connections:     Frequency of Communication with Friends and Family: Not on file    Frequency of Social Gatherings with Friends and Family: Not on file    Attends Anabaptism Services: Not on file    Active Member of Clubs or Organizations: Not on file    Attends Club or Organization Meetings: Not on file    Marital Status: Not on file   Intimate Partner Violence:     Fear of Current or Ex-Partner: Not on file    Emotionally Abused: Not on file    Physically Abused: Not on file    Sexually Abused: Not on file   Housing Stability:     Unable to Pay for Housing in the Last Year: Not on file    Number of [place] in the Last Year: Not on file    Unstable Housing in the Last Year: Not on file         ALLERGIES: Patient has no known allergies. Review of Systems   Constitutional: Negative for chills and fever. HENT: Positive for congestion and sore throat. Negative for rhinorrhea. Respiratory: Positive for cough and shortness of breath. Cardiovascular: Negative for chest pain. Gastrointestinal: Negative for abdominal pain, diarrhea, nausea and vomiting. Genitourinary: Negative for dysuria and urgency. Musculoskeletal: Negative for arthralgias and back pain. Skin: Negative for rash. Neurological: Negative for dizziness, weakness and light-headedness. Vitals:    12/06/21 1208 12/06/21 1254 12/06/21 1307   BP:  (!) 136/92 (!) 145/129   Pulse: (!) 121 (!) 150 (!) 103   Resp:  26 16   Temp:  98.1 °F (36.7 °C) 99.5 °F (37.5 °C)   SpO2: 99% 98% 96%   Weight:  63.5 kg (140 lb)    Height:  5' (1.524 m)             Physical Exam     Vital signs reviewed. Nursing notes reviewed.     Const:  No acute distress, well developed, well nourished  Head:  Atraumatic, normocephalic  Eyes:  PERRL, conjunctiva normal, no scleral icterus  Neck:  Supple, trachea midline  Cardiovascular:  tachycardic  Resp:  Moderate resp distress, + increased work of breathing, diffuse wheezes in all lung fields, no rhonchi, no rales,  Abd:  Soft, non-tender, non-distended  MSK:  No pedal edema, normal ROM  Neuro:  Alert and oriented x3, no cranial nerve defect  Skin:  Warm, dry, intact  Psych: normal mood and affect, behavior is normal, judgement and thought content is normal          MDM  Number of Diagnoses or Management Options     Amount and/or Complexity of Data Reviewed  Clinical lab tests: ordered and reviewed  Tests in the radiology section of CPT®: ordered and reviewed  Review and summarize past medical records: yes    Patient Progress  Patient progress: stable         Procedures        Perfect Serve Consult for Admission  2:06 PM    ED Room Number: ER07/07  Patient Name and age:  Levi Bush 39 y.o.  female  Working Diagnosis:   1. Moderate persistent asthma with acute exacerbation    2. Respiratory distress        COVID-19 Suspicion:  yes  Sepsis present:  no  Reassessment needed: no  Readmission: no  Isolation Requirements:  yes  Recommended Level of Care:  telemetry  Department:[facility] ED - [phone]  Other:  Recent negative covid test, but we will re-check. Total critical care time spent exclusive of procedures:  28    Ms. Kwan Narayanan is a 45yo female who presents to the ER with complaints of SOB. She became very tachycardic with a HR in the 150s with exertion. No pneumonia or edema on xray. Her exam is consistent with asthma. She has been on steroids for 6 days, but she remains SOB. Pt.  To be evaluated for admission by the hospitalist.

## 2021-12-06 NOTE — H&P
[facility]    Hospitalist Admission Note      NAME:  Nathaly Joe   :   1985   MRN:  [id number]     PCP:  Rachel Birch NP     Date/Time of service:  2021 2:57 PM          Subjective:     CHIEF COMPLAINT: dyspnea    HISTORY OF PRESENT ILLNESS:     Ms. Jena Carepnter is a 39 y.o.  female who presented to the Emergency Department complaining of dyspnea. Worsening over many days. Has been seen by pulmonary on steroid/doxycycline course without improvement. ER finds tachypnea, but no hypoxia. Of note, she reports that she and her brother and father all developed adult onset Asthma simultaneously in 2019. We will admit her for observation. Past Medical History:   Diagnosis Date    Asthma     GERD (gastroesophageal reflux disease)         No past surgical history on file. Social History     Tobacco Use    Smoking status: Never Smoker    Smokeless tobacco: Never Used   Substance Use Topics    Alcohol use: Not on file        No family history on file. Family hx cannot be fully assessed, since the patient cannot provide information    No Known Allergies     Prior to Admission medications    Medication Sig Start Date End Date Taking? Authorizing Provider   zafirlukast (ACCOLATE) 20 mg tablet Take 20 mg by mouth two (2) times a day. Provider, Historical   pantoprazole (PROTONIX) 40 mg tablet Take 40 mg by mouth daily. Provider, Historical   famotidine (PEPCID) 10 mg tablet Take 10 mg by mouth daily. Provider, Historical   norgestimate-ethinyl estradiol (ORTHO-CYCLEN, SPRINTEC) 0.25-35 mg-mcg tab Take 1 Tab by mouth daily. Provider, Historical   levalbuterol (XOPENEX) 1.25 mg/0.5 mL nebu 1.25 mg by Nebulization route. Provider, Historical   budesonide-formoterol (SYMBICORT) 80-4.5 mcg/actuation HFAA Take 2 Puffs by inhalation two (2) times a day. Provider, Historical   triamcinolone (NASACORT) 55 mcg nasal inhaler 2 Sprays daily.     Provider, Historical   guaiFENesin-dextromethorphan SR (MUCINEX DM) 600-30 mg per tablet Take 1 Tab by mouth two (2) times a day. Provider, Historical   albuterol sulfate 90 mcg/actuation aepb Take 2 Puffs by inhalation every four (4) hours as needed for Cough.  10/17/19   Anthony Martinez MD       Review of Systems:  (bold if positive, if negative)    Gen:  Eyes:  ENT:  CVS:  Pulm:  Cough, dyspnea,GI:  :  MS:  Skin:  Psych:  Endo:  Hem:  Renal:  Neuro:        Objective:      VITALS:    Vital signs reviewed; most recent are:    Visit Vitals  BP (!) 145/129 (BP 1 Location: Left upper arm)   Pulse (!) 103   Temp 99.5 °F (37.5 °C)   Resp 16   Ht 5' (1.524 m)   Wt 63.5 kg (140 lb)   SpO2 96%   BMI 27.34 kg/m²     SpO2 Readings from Last 6 Encounters:   12/06/21 96%   10/17/19 98%        No intake or output data in the 24 hours ending 12/06/21 2047     Exam:     Physical Exam:    Gen:  Well-developed, well-nourished, in no acute distress  HEENT:  Pink conjunctivae, PERRL, hearing intact to voice, moist mucous membranes  Neck:  Supple, without masses, thyroid non-tender  Resp:  Mild accessory muscle use, clear breath sounds without wheezes rales or rhonchi  Card:  No murmurs, normal S1, S2 without thrills, bruits or peripheral edema  Abd:  Soft, non-tender, non-distended, normoactive bowel sounds are present, no mass  Lymph:  No cervical or inguinal adenopathy  Musc:  No cyanosis or clubbing  Skin:  No rashes or ulcers, skin turgor is good  Neuro:  Cranial nerves are grossly intact, no focal motor weakness, follows commands appropriately  Psych:  Good insight, oriented to person, place and time, alert     Labs:    Recent Labs     12/06/21  1310   WBC 14.6*   HGB 14.4   HCT 42.5   *     Recent Labs     12/06/21  1310      K 3.9      CO2 22   *   BUN 15   CREA 0.86   CA 9.1   ALB 3.6   TBILI 0.4   ALT 15     No results found for: GLUCPOC  No results for input(s): PH, PCO2, PO2, HCO3, FIO2 in the last 72 hours. No results for input(s): INR, INREXT in the last 72 hours. All Micro Results     Procedure Component Value Units Date/Time    CULTURE, URINE [[id number]]     Order Status: Sent Specimen: Urine from Clean catch     RESPIRATORY VIRUS PANEL W/COVID-19, PCR [[id number]]     Order Status: Sent Specimen: NASOPHARYNGEAL SWAB     COVID-19 RAPID TEST [[id number]]     Order Status: Sent           I have reviewed previous records       Assessment and Plan:      Asthma exacerbation / Tachypnea / Dyspnea - POA, moderate, unclear trigger, but I suspect GERD/aspiration vs URI vs other. I actually do not hear wheezing. For now IV solumedrol, zafirlukast, nasocort, brovana, pulmicort (for symbicort) and prn duonebs. Consult pulmonary. Check 6 minute walk. GERD (gastroesophageal reflux disease) - PPI. She is compliant with PPI, no alcohol, no late meals, and she sleeps on a wedge. Leukocytosis - Presumed due to recent steroids. Xray clear. No Abx, but check RVP and inflammatory markers. Tachycardia - A persistent issue. Now worse due to nebs. She had a negative cardiac workup for this in the past.    Telemetry reviewed:   normal sinus rhythm    Risk of deterioration: high      Total time spent with patient: 48 Minutes I personally reviewed chart, notes, data and current medications in the medical record. I have personally examined and treated the patient at bedside during this period.                  Care Plan discussed with: Patient, Nursing Staff and >50% of time spent in counseling and coordination of care    Discussed:  Care Plan       ___________________________________________________    Attending Physician: Karolyn House MD

## 2021-12-06 NOTE — PROGRESS NOTES
Reason for Admission:  Asthma                   RUR Score:    N/A- patient is on observation status. Letter reviewed with patient by phone and copy of letter will be given to RN to give to patient. Plan for utilizing home health:     Not indicated     PCP: First and Last name:  Margaret Rios NP   Name of Practice:    Are you a current patient: Yes/No: yes   Approximate date of last visit: summer 2021   Can you participate in a virtual visit with your PCP: yes                    Current Advanced Directive/Advance Care Plan: Full Code      Healthcare Decision Maker:   Len Sainikeeper, spouse - [phone]                         Transition of Care Plan:                    Initial assessment was completed with patient by phone. Charted address and contact information were verified. She lives with her  in a two story house with two steps to enter. Main living space is located on the first floor. Patient reports being independent with ADLs, drives and works full-time as a . She has no prior HH hx and owns a nebulizer. Patient's preferred pharmacy is [facility]. 1. CM will follow  2. Home when stable with family  3. Outpatient follow-up  4. Patient will arrange her own transportation at discharge    Care Management Interventions  PCP Verified by CM:  Yes  Support Systems: Spouse/Significant Other  The Plan for Transition of Care is Related to the Following Treatment Goals : Asthma  Discharge Location  Discharge Placement: Home with outpatient services     Liv Schwab LCSW

## 2021-12-07 LAB
ANION GAP SERPL CALC-SCNC: 5 MMOL/L (ref 5–15)
ATRIAL RATE: 131 BPM
B PERT DNA SPEC QL NAA+PROBE: NOT DETECTED
BACTERIA SPEC CULT: NORMAL
BORDETELLA PARAPERTUSSIS PCR, BORPAR: NOT DETECTED
BUN SERPL-MCNC: 12 MG/DL (ref 6–20)
BUN/CREAT SERPL: 18 (ref 12–20)
C PNEUM DNA SPEC QL NAA+PROBE: NOT DETECTED
CALCIUM SERPL-MCNC: 8.9 MG/DL (ref 8.5–10.1)
CALCULATED P AXIS, ECG09: 51 DEGREES
CALCULATED R AXIS, ECG10: 59 DEGREES
CALCULATED T AXIS, ECG11: 57 DEGREES
CHLORIDE SERPL-SCNC: 108 MMOL/L (ref 97–108)
CO2 SERPL-SCNC: 24 MMOL/L (ref 21–32)
CREAT SERPL-MCNC: 0.67 MG/DL (ref 0.55–1.02)
DIAGNOSIS, 93000: NORMAL
ERYTHROCYTE [DISTWIDTH] IN BLOOD BY AUTOMATED COUNT: 13.2 % (ref 11.5–14.5)
FLUAV H1 2009 PAND RNA SPEC QL NAA+PROBE: NOT DETECTED
FLUAV H1 RNA SPEC QL NAA+PROBE: NOT DETECTED
FLUAV H3 RNA SPEC QL NAA+PROBE: NOT DETECTED
FLUAV SUBTYP SPEC NAA+PROBE: NOT DETECTED
FLUBV RNA SPEC QL NAA+PROBE: NOT DETECTED
GLUCOSE SERPL-MCNC: 118 MG/DL (ref 65–100)
HADV DNA SPEC QL NAA+PROBE: NOT DETECTED
HCOV 229E RNA SPEC QL NAA+PROBE: NOT DETECTED
HCOV HKU1 RNA SPEC QL NAA+PROBE: NOT DETECTED
HCOV NL63 RNA SPEC QL NAA+PROBE: NOT DETECTED
HCOV OC43 RNA SPEC QL NAA+PROBE: NOT DETECTED
HCT VFR BLD AUTO: 39 % (ref 35–47)
HGB BLD-MCNC: 12.8 G/DL (ref 11.5–16)
HMPV RNA SPEC QL NAA+PROBE: NOT DETECTED
HPIV1 RNA SPEC QL NAA+PROBE: NOT DETECTED
HPIV2 RNA SPEC QL NAA+PROBE: NOT DETECTED
HPIV3 RNA SPEC QL NAA+PROBE: NOT DETECTED
HPIV4 RNA SPEC QL NAA+PROBE: NOT DETECTED
M PNEUMO DNA SPEC QL NAA+PROBE: NOT DETECTED
MAGNESIUM SERPL-MCNC: 2.3 MG/DL (ref 1.6–2.4)
MCH RBC QN AUTO: 28.3 PG (ref 26–34)
MCHC RBC AUTO-ENTMCNC: 32.8 G/DL (ref 30–36.5)
MCV RBC AUTO: 86.3 FL (ref 80–99)
NRBC # BLD: 0 K/UL (ref 0–0.01)
NRBC BLD-RTO: 0 PER 100 WBC
P-R INTERVAL, ECG05: 124 MS
PHOSPHATE SERPL-MCNC: 2.6 MG/DL (ref 2.6–4.7)
PLATELET # BLD AUTO: 460 K/UL (ref 150–400)
PMV BLD AUTO: 9.8 FL (ref 8.9–12.9)
POTASSIUM SERPL-SCNC: 4.2 MMOL/L (ref 3.5–5.1)
PROCALCITONIN SERPL-MCNC: <0.05 NG/ML
Q-T INTERVAL, ECG07: 286 MS
QRS DURATION, ECG06: 70 MS
QTC CALCULATION (BEZET), ECG08: 422 MS
RBC # BLD AUTO: 4.52 M/UL (ref 3.8–5.2)
RSV RNA SPEC QL NAA+PROBE: NOT DETECTED
RV+EV RNA SPEC QL NAA+PROBE: NOT DETECTED
SARS-COV-2 PCR, COVPCR: NOT DETECTED
SERVICE CMNT-IMP: NORMAL
SODIUM SERPL-SCNC: 137 MMOL/L (ref 136–145)
VENTRICULAR RATE, ECG03: 131 BPM
WBC # BLD AUTO: 12 K/UL (ref 3.6–11)

## 2021-12-07 PROCEDURE — 74011000250 HC RX REV CODE- 250: Performed by: INTERNAL MEDICINE

## 2021-12-07 PROCEDURE — 94618 PULMONARY STRESS TESTING: CPT

## 2021-12-07 PROCEDURE — 94640 AIRWAY INHALATION TREATMENT: CPT

## 2021-12-07 PROCEDURE — 74011250637 HC RX REV CODE- 250/637: Performed by: INTERNAL MEDICINE

## 2021-12-07 PROCEDURE — 84145 PROCALCITONIN (PCT): CPT

## 2021-12-07 PROCEDURE — 85027 COMPLETE CBC AUTOMATED: CPT

## 2021-12-07 PROCEDURE — 80048 BASIC METABOLIC PNL TOTAL CA: CPT

## 2021-12-07 PROCEDURE — G0378 HOSPITAL OBSERVATION PER HR: HCPCS

## 2021-12-07 PROCEDURE — 74011250636 HC RX REV CODE- 250/636: Performed by: INTERNAL MEDICINE

## 2021-12-07 PROCEDURE — 94664 DEMO&/EVAL PT USE INHALER: CPT

## 2021-12-07 PROCEDURE — 77030013140 HC MSK NEB VYRM -A

## 2021-12-07 PROCEDURE — 96376 TX/PRO/DX INJ SAME DRUG ADON: CPT

## 2021-12-07 PROCEDURE — 84100 ASSAY OF PHOSPHORUS: CPT

## 2021-12-07 PROCEDURE — 77030012341 HC CHMB SPCR OPTC MDI VYRM -A

## 2021-12-07 PROCEDURE — 36415 COLL VENOUS BLD VENIPUNCTURE: CPT

## 2021-12-07 PROCEDURE — 83735 ASSAY OF MAGNESIUM: CPT

## 2021-12-07 RX ORDER — IPRATROPIUM BROMIDE AND ALBUTEROL SULFATE 2.5; .5 MG/3ML; MG/3ML
3 SOLUTION RESPIRATORY (INHALATION)
Status: DISCONTINUED | OUTPATIENT
Start: 2021-12-07 | End: 2021-12-08 | Stop reason: HOSPADM

## 2021-12-07 RX ORDER — GUAIFENESIN 100 MG/5ML
100 SOLUTION ORAL
Status: DISCONTINUED | OUTPATIENT
Start: 2021-12-07 | End: 2021-12-07

## 2021-12-07 RX ORDER — AZELASTINE HCL 205.5 UG/1
2 SPRAY NASAL 2 TIMES DAILY
COMMUNITY

## 2021-12-07 RX ORDER — GUAIFENESIN/DEXTROMETHORPHAN 100-10MG/5
10 SYRUP ORAL EVERY 6 HOURS
Status: DISCONTINUED | OUTPATIENT
Start: 2021-12-07 | End: 2021-12-08 | Stop reason: HOSPADM

## 2021-12-07 RX ORDER — GUAIFENESIN/DEXTROMETHORPHAN 100-10MG/5
10 SYRUP ORAL EVERY 6 HOURS
Status: DISCONTINUED | OUTPATIENT
Start: 2021-12-07 | End: 2021-12-07

## 2021-12-07 RX ORDER — BUDESONIDE AND FORMOTEROL FUMARATE DIHYDRATE 160; 4.5 UG/1; UG/1
2 AEROSOL RESPIRATORY (INHALATION) 2 TIMES DAILY
COMMUNITY

## 2021-12-07 RX ORDER — CETIRIZINE HYDROCHLORIDE, PSEUDOEPHEDRINE HYDROCHLORIDE 5; 120 MG/1; MG/1
1 TABLET, FILM COATED, EXTENDED RELEASE ORAL
COMMUNITY

## 2021-12-07 RX ORDER — BENZONATATE 100 MG/1
100 CAPSULE ORAL
Status: DISCONTINUED | OUTPATIENT
Start: 2021-12-07 | End: 2021-12-08 | Stop reason: HOSPADM

## 2021-12-07 RX ADMIN — IPRATROPIUM BROMIDE AND ALBUTEROL SULFATE 3 ML: .5; 2.5 SOLUTION RESPIRATORY (INHALATION) at 21:05

## 2021-12-07 RX ADMIN — METHYLPREDNISOLONE SODIUM SUCCINATE 40 MG: 40 INJECTION, POWDER, FOR SOLUTION INTRAMUSCULAR; INTRAVENOUS at 17:06

## 2021-12-07 RX ADMIN — FAMOTIDINE 20 MG: 10 INJECTION, SOLUTION INTRAVENOUS at 21:51

## 2021-12-07 RX ADMIN — BUDESONIDE AND FORMOTEROL FUMARATE DIHYDRATE 2 PUFF: 80; 4.5 AEROSOL RESPIRATORY (INHALATION) at 07:31

## 2021-12-07 RX ADMIN — ZAFIRLUKAST 20 MG: 20 TABLET, COATED ORAL at 02:04

## 2021-12-07 RX ADMIN — GUAIFENESIN AND DEXTROMETHORPHAN 10 ML: 100; 10 SYRUP ORAL at 23:31

## 2021-12-07 RX ADMIN — IPRATROPIUM BROMIDE AND ALBUTEROL SULFATE 3 ML: .5; 2.5 SOLUTION RESPIRATORY (INHALATION) at 14:23

## 2021-12-07 RX ADMIN — BUDESONIDE AND FORMOTEROL FUMARATE DIHYDRATE 2 PUFF: 80; 4.5 AEROSOL RESPIRATORY (INHALATION) at 21:00

## 2021-12-07 RX ADMIN — METHYLPREDNISOLONE SODIUM SUCCINATE 40 MG: 40 INJECTION, POWDER, FOR SOLUTION INTRAMUSCULAR; INTRAVENOUS at 23:31

## 2021-12-07 RX ADMIN — Medication 10 ML: at 17:07

## 2021-12-07 RX ADMIN — IPRATROPIUM BROMIDE AND ALBUTEROL SULFATE 3 ML: .5; 2.5 SOLUTION RESPIRATORY (INHALATION) at 08:09

## 2021-12-07 RX ADMIN — GUAIFENESIN AND DEXTROMETHORPHAN 10 ML: 100; 10 SYRUP ORAL at 12:04

## 2021-12-07 RX ADMIN — METHYLPREDNISOLONE SODIUM SUCCINATE 40 MG: 40 INJECTION, POWDER, FOR SOLUTION INTRAMUSCULAR; INTRAVENOUS at 06:26

## 2021-12-07 RX ADMIN — GUAIFENESIN AND DEXTROMETHORPHAN 10 ML: 100; 10 SYRUP ORAL at 09:20

## 2021-12-07 RX ADMIN — ZAFIRLUKAST 20 MG: 20 TABLET, COATED ORAL at 18:04

## 2021-12-07 RX ADMIN — METHYLPREDNISOLONE SODIUM SUCCINATE 40 MG: 40 INJECTION, POWDER, FOR SOLUTION INTRAMUSCULAR; INTRAVENOUS at 12:04

## 2021-12-07 RX ADMIN — Medication 10 ML: at 06:27

## 2021-12-07 RX ADMIN — ACETAMINOPHEN 650 MG: 325 TABLET ORAL at 12:11

## 2021-12-07 RX ADMIN — GUAIFENESIN AND DEXTROMETHORPHAN 10 ML: 100; 10 SYRUP ORAL at 17:06

## 2021-12-07 RX ADMIN — ZAFIRLUKAST 20 MG: 20 TABLET, COATED ORAL at 09:20

## 2021-12-07 RX ADMIN — PANTOPRAZOLE SODIUM 40 MG: 40 TABLET, DELAYED RELEASE ORAL at 09:20

## 2021-12-07 RX ADMIN — FAMOTIDINE 20 MG: 10 INJECTION, SOLUTION INTRAVENOUS at 09:20

## 2021-12-07 RX ADMIN — Medication 10 ML: at 21:52

## 2021-12-07 NOTE — PROGRESS NOTES
[facility] [address], [place], [address] [facility]  [phone]      Medical Progress Note      NAME: Jazmin Chavira   :  1985  MRM:  [id number]    Date/Time of service: 2021  8:59 AM       Subjective:     Chief Complaint:  Patient was personally seen and examined by me during this time period. Chart reviewed. Still dyspneic, worse with exertion. Also coughing        Objective:       Vitals:       Last 24hrs VS reviewed since prior progress note.  Most recent are:    Visit Vitals  /71   Pulse 95   Temp 97.6 °F (36.4 °C)   Resp 15   Ht 5' (1.524 m)   Wt 63.5 kg (140 lb)   SpO2 97%   BMI 27.34 kg/m²     SpO2 Readings from Last 6 Encounters:   21 97%   10/17/19 98%            Intake/Output Summary (Last 24 hours) at 2021 0859  Last data filed at 2021 1830  Gross per 24 hour   Intake 1480 ml   Output 600 ml   Net 880 ml        Exam:     Physical Exam:    Gen:  Well-developed, well-nourished, in no acute distress  HEENT:  Pink conjunctivae, PERRL, hearing intact to voice, moist mucous membranes  Neck:  Supple, without masses, thyroid non-tender  Resp:  No accessory muscle use, minimal wheezing   Card:  No murmurs, normal S1, S2 without thrills, bruits or peripheral edema  Abd:  Soft, non-tender, non-distended, normoactive bowel sounds are present  Musc:  No cyanosis or clubbing  Skin:  No rashes   Neuro:  Cranial nerves 3-12 are grossly intact, follows commands appropriately  Psych:  Good insight, oriented to person, place and time, alert    Medications Reviewed: (see below)    Lab Data Reviewed: (see below)    ______________________________________________________________________    Medications:     Current Facility-Administered Medications   Medication Dose Route Frequency    albuterol-ipratropium (DUO-NEB) 2.5 MG-0.5 MG/3 ML  3 mL Nebulization Q6H RT    benzonatate (TESSALON) capsule 100 mg  100 mg Oral TID PRN    guaiFENesin-dextromethorphan (ROBITUSSIN DM) 100-10 mg/5 mL syrup 10 mL  10 mL Oral Q6H    methylPREDNISolone (PF) (SOLU-MEDROL) injection 40 mg  40 mg IntraVENous Q6H    sodium chloride (NS) flush 5-40 mL  5-40 mL IntraVENous Q8H    sodium chloride (NS) flush 5-40 mL  5-40 mL IntraVENous PRN    acetaminophen (TYLENOL) tablet 650 mg  650 mg Oral Q6H PRN    Or    acetaminophen (TYLENOL) suppository 650 mg  650 mg Rectal Q6H PRN    polyethylene glycol (MIRALAX) packet 17 g  17 g Oral DAILY PRN    ondansetron (ZOFRAN ODT) tablet 4 mg  4 mg Oral Q8H PRN    Or    ondansetron (ZOFRAN) injection 4 mg  4 mg IntraVENous Q6H PRN    enoxaparin (LOVENOX) injection 40 mg  40 mg SubCUTAneous DAILY    pantoprazole (PROTONIX) tablet 40 mg  40 mg Oral DAILY    fluticasone propionate (FLONASE) 50 mcg/actuation nasal spray 2 Spray  2 Spray Both Nostrils DAILY    zafirlukast (ACCOLATE) tablet 20 mg  20 mg Oral BID    famotidine (PF) (PEPCID) 20 mg in 0.9% sodium chloride 10 mL injection  20 mg IntraVENous Q12H    budesonide-formoterol (SYMBICORT) 80-4.5 mcg inhaler  2 Puff Inhalation BID RT     Current Outpatient Medications   Medication Sig    zafirlukast (ACCOLATE) 20 mg tablet Take 20 mg by mouth two (2) times a day.  pantoprazole (PROTONIX) 40 mg tablet Take 40 mg by mouth daily.  famotidine (PEPCID) 10 mg tablet Take 10 mg by mouth daily.  norgestimate-ethinyl estradiol (ORTHO-CYCLEN, SPRINTEC) 0.25-35 mg-mcg tab Take 1 Tab by mouth daily.  levalbuterol (XOPENEX) 1.25 mg/0.5 mL nebu 1.25 mg by Nebulization route.  budesonide-formoterol (SYMBICORT) 80-4.5 mcg/actuation HFAA Take 2 Puffs by inhalation two (2) times a day.  triamcinolone (NASACORT) 55 mcg nasal inhaler 2 Sprays daily.  guaiFENesin-dextromethorphan SR (MUCINEX DM) 600-30 mg per tablet Take 1 Tab by mouth two (2) times a day.  albuterol sulfate 90 mcg/actuation aepb Take 2 Puffs by inhalation every four (4) hours as needed for Cough.           Lab Review: Recent Labs     12/07/21  0358 12/06/21  1310   WBC 12.0* 14.6*   HGB 12.8 14.4   HCT 39.0 42.5   * 511*     Recent Labs     12/07/21  0358 12/06/21  1310    138   K 4.2 3.9    107   CO2 24 22   * 117*   BUN 12 15   CREA 0.67 0.86   CA 8.9 9.1   MG 2.3  --    PHOS 2.6  --    ALB  --  3.6   TBILI  --  0.4   ALT  --  15     No results found for: GLUCPOC       Assessment / Plan:     Principal Problem:    38 yo hx of asthma, presented w/ dyspnea, asthma exacerbation    1) Asthma exacerbation/bronchospasm/dyspnea: slow to improve. Chest CT unremarkable. Viral panel/COVID neg. Cont IV solumedrol, LABA/ICS, nebs. Schedule guaifenesin. Pulm following    2) Leukocytosis: due to steroids    3) GERD: cont PPI    4) Intermittent tachycardia: recently saw Cards. Echo normal.  Chest CT neg PE.   Will monitor     Total time spent with patient: 28 Minutes **I personally saw and examined the patient during this time period**                 Care Plan discussed with: Patient, nursing     Discussed:  Care Plan    Prophylaxis:  Lovenox    Disposition:  Home w/Family           ___________________________________________________    Attending Physician: Akshat Sprague MD

## 2021-12-07 NOTE — PROGRESS NOTES
12/07/21 1045   Resting (Room Air)   SpO2 97      During Walk (Room Air)   SpO2 99      Rate of Dyspnea 2   Symptoms Other (comment); Shortness of breath; Chest tightness   Comments pt with dry, hacking cough and increased HR. After Walk   SpO2 97      Comments walked in room. returned to bed on room air.    Does the Patient Qualify for Home O2 No

## 2021-12-07 NOTE — PROGRESS NOTES
[facility]     Name: Jose Santos MRN: [id number]   : 1985 Hospital: [address]   Date: 2021        Impression Plan   1. Asthma exacerbation  2. GERD  3. Environmental allergies               · Continue IV solumedrol  · Pulmicort/Brovana  · Add Duonebs- pt COVID 19 negative  · Continue home famotidine/PPI  · OOB into chair as much as possible           Radiology  ( personally reviewed) CTA chest: no infiltrates, no PE   ABG No results for input(s): PHI, PO2I, PCO2I in the last 72 hours. Subjective     Cc: shortness of breath    40 yo with PMHx of asthma presenting for increasng shortness of breath. Sees Dr. Milli Fine as outpatient and last week received IM steroid along with steroid taper, however did not improve. Denies sick contacts. GERD is controlled and allergies have been controlled with zafir lukast. As outpatient she is managed on Symbicort 160/Spiriva and xopenex. Non-smoker. Overnight events:   Feels ok as long as she is sitting still. 100% on RA. Past Medical History:   Diagnosis Date    Asthma     GERD (gastroesophageal reflux disease)       No past surgical history on file. Prior to Admission medications    Medication Sig Start Date End Date Taking? Authorizing Provider   zafirlukast (ACCOLATE) 20 mg tablet Take 20 mg by mouth two (2) times a day. Provider, Historical   pantoprazole (PROTONIX) 40 mg tablet Take 40 mg by mouth daily. Provider, Historical   famotidine (PEPCID) 10 mg tablet Take 10 mg by mouth daily. Provider, Historical   norgestimate-ethinyl estradiol (ORTHO-CYCLEN, SPRINTEC) 0.25-35 mg-mcg tab Take 1 Tab by mouth daily. Provider, Historical   levalbuterol (XOPENEX) 1.25 mg/0.5 mL nebu 1.25 mg by Nebulization route. Provider, Historical   budesonide-formoterol (SYMBICORT) 80-4.5 mcg/actuation HFAA Take 2 Puffs by inhalation two (2) times a day.     Provider, Historical   triamcinolone (NASACORT) 55 mcg nasal inhaler 2 Sprays daily. Provider, Historical   guaiFENesin-dextromethorphan SR (MUCINEX DM) 600-30 mg per tablet Take 1 Tab by mouth two (2) times a day. Provider, Historical   albuterol sulfate 90 mcg/actuation aepb Take 2 Puffs by inhalation every four (4) hours as needed for Cough. 10/17/19   Ulysses Kappa, MD     Current Facility-Administered Medications   Medication Dose Route Frequency    albuterol-ipratropium (DUO-NEB) 2.5 MG-0.5 MG/3 ML  3 mL Nebulization Q6H RT    methylPREDNISolone (PF) (SOLU-MEDROL) injection 40 mg  40 mg IntraVENous Q6H    sodium chloride (NS) flush 5-40 mL  5-40 mL IntraVENous Q8H    enoxaparin (LOVENOX) injection 40 mg  40 mg SubCUTAneous DAILY    pantoprazole (PROTONIX) tablet 40 mg  40 mg Oral DAILY    fluticasone propionate (FLONASE) 50 mcg/actuation nasal spray 2 Spray  2 Spray Both Nostrils DAILY    zafirlukast (ACCOLATE) tablet 20 mg  20 mg Oral BID    famotidine (PF) (PEPCID) 20 mg in 0.9% sodium chloride 10 mL injection  20 mg IntraVENous Q12H    budesonide-formoterol (SYMBICORT) 80-4.5 mcg inhaler  2 Puff Inhalation BID RT     Allergies   Allergen Reactions    Latex Other (comments)     rash    Galactose-Alpha-1,3-Galactose (Alpha-Gal) Anaphylaxis     Hives, gi upset    Phenazopyridine Other (comments)     Low bp, tacycardic, asthmatic flare    Sulfamethoxazole-Trimethoprim Other (comments)     Low bp, thacycardic, asthmatic flare      Social History     Tobacco Use    Smoking status: Never Smoker    Smokeless tobacco: Never Used   Substance Use Topics    Alcohol use: Not on file      No family history on file. Laboratory: I have personally reviewed the critical care flowsheet and labs.      Recent Labs     12/07/21  0358 12/06/21  1310   WBC 12.0* 14.6*   HGB 12.8 14.4   HCT 39.0 42.5   * 511*     Recent Labs     12/07/21  0358 12/06/21  1310    138   K 4.2 3.9    107   CO2 24 22   * 117*   BUN 12 15   CREA 0.67 0.86   CA 8.9 9.1   MG 2.3  --    PHOS 2.6  --    ALB  --  3.6   ALT  --  15       Objective:     Mode Rate Tidal Volume Pressure FiO2 PEEP                    Vital Signs:     TMAX(24)      Intake/Output:   Last shift:         Last 3 shifts: No intake/output data recorded. [id number]    Intake/Output Summary (Last 24 hours) at 12/7/2021 0829  Last data filed at 12/6/2021 1830  Gross per 24 hour   Intake 1480 ml   Output 600 ml   Net 880 ml     EXAM:   GENERAL: Awake,alert, hoarse voice, HEENT:  PERRL, EOMI, no alar flaring or epistaxis, oral mucosa moist without cyanosis, NECK:  no jugular vein distention, no retractions, no thyromegaly or masses, LUNGS: CTA, no w/r/r, no w/r/r HEART:  Regular rate and rhythm with no MGR; no edema is present, ABDOMEN:  soft with no tenderness, bowel sounds present, EXTREMITIES:  warm with no cyanosis, SKIN:  no jaundice or ecchymosis and NEUROLOGIC:  alert and oriented, grossly non-focal    Nyeal MD [facility]  [facility] [place]

## 2021-12-08 VITALS
WEIGHT: 142.2 LBS | TEMPERATURE: 98.2 F | OXYGEN SATURATION: 97 % | HEART RATE: 98 BPM | BODY MASS INDEX: 27.92 KG/M2 | RESPIRATION RATE: 18 BRPM | SYSTOLIC BLOOD PRESSURE: 133 MMHG | HEIGHT: 60 IN | DIASTOLIC BLOOD PRESSURE: 75 MMHG

## 2021-12-08 PROCEDURE — 74011250637 HC RX REV CODE- 250/637: Performed by: INTERNAL MEDICINE

## 2021-12-08 PROCEDURE — 74011000250 HC RX REV CODE- 250: Performed by: INTERNAL MEDICINE

## 2021-12-08 PROCEDURE — 74011250636 HC RX REV CODE- 250/636: Performed by: INTERNAL MEDICINE

## 2021-12-08 PROCEDURE — 96376 TX/PRO/DX INJ SAME DRUG ADON: CPT

## 2021-12-08 PROCEDURE — G0378 HOSPITAL OBSERVATION PER HR: HCPCS

## 2021-12-08 PROCEDURE — 94640 AIRWAY INHALATION TREATMENT: CPT

## 2021-12-08 RX ORDER — BENZONATATE 100 MG/1
100 CAPSULE ORAL
Qty: 21 CAPSULE | Refills: 0 | Status: SHIPPED | OUTPATIENT
Start: 2021-12-08 | End: 2021-12-15

## 2021-12-08 RX ORDER — PREDNISONE 10 MG/1
TABLET ORAL
Qty: 21 TABLET | Refills: 0 | Status: ON HOLD | OUTPATIENT
Start: 2021-12-08 | End: 2022-01-24 | Stop reason: SDUPTHER

## 2021-12-08 RX ORDER — IPRATROPIUM BROMIDE AND ALBUTEROL SULFATE 2.5; .5 MG/3ML; MG/3ML
3 SOLUTION RESPIRATORY (INHALATION)
Qty: 30 NEBULE | Refills: 1 | Status: SHIPPED | OUTPATIENT
Start: 2021-12-08

## 2021-12-08 RX ADMIN — METHYLPREDNISOLONE SODIUM SUCCINATE 40 MG: 40 INJECTION, POWDER, FOR SOLUTION INTRAMUSCULAR; INTRAVENOUS at 05:48

## 2021-12-08 RX ADMIN — FLUTICASONE PROPIONATE 2 SPRAY: 50 SPRAY, METERED NASAL at 10:21

## 2021-12-08 RX ADMIN — IPRATROPIUM BROMIDE AND ALBUTEROL SULFATE 3 ML: .5; 2.5 SOLUTION RESPIRATORY (INHALATION) at 08:21

## 2021-12-08 RX ADMIN — Medication 10 ML: at 05:48

## 2021-12-08 RX ADMIN — ARFORMOTEROL TARTRATE: 15 SOLUTION RESPIRATORY (INHALATION) at 08:21

## 2021-12-08 RX ADMIN — METHYLPREDNISOLONE SODIUM SUCCINATE 40 MG: 40 INJECTION, POWDER, FOR SOLUTION INTRAMUSCULAR; INTRAVENOUS at 12:14

## 2021-12-08 RX ADMIN — FAMOTIDINE 20 MG: 10 INJECTION, SOLUTION INTRAVENOUS at 10:20

## 2021-12-08 RX ADMIN — GUAIFENESIN AND DEXTROMETHORPHAN 10 ML: 100; 10 SYRUP ORAL at 12:14

## 2021-12-08 RX ADMIN — IPRATROPIUM BROMIDE AND ALBUTEROL SULFATE 3 ML: .5; 2.5 SOLUTION RESPIRATORY (INHALATION) at 01:30

## 2021-12-08 RX ADMIN — GUAIFENESIN AND DEXTROMETHORPHAN 10 ML: 100; 10 SYRUP ORAL at 05:48

## 2021-12-08 RX ADMIN — ZAFIRLUKAST 20 MG: 20 TABLET, COATED ORAL at 10:20

## 2021-12-08 RX ADMIN — ACETAMINOPHEN 650 MG: 325 TABLET ORAL at 10:30

## 2021-12-08 RX ADMIN — PANTOPRAZOLE SODIUM 40 MG: 40 TABLET, DELAYED RELEASE ORAL at 10:20

## 2021-12-08 NOTE — PROGRESS NOTES
Hospital Follow Up. PCP Nikky Naylor NP Nurse will Contact Patient directly for Follow Up Appointment. Requesting that Discharge Summary and Medication List be Faxed to [phone].  Will Forward to JULIETA

## 2021-12-08 NOTE — PROGRESS NOTES
12/8/2021  Case Management Progress Note    12:00 PM  Patient is 39year old female admitted 12/6 with asthma exacerbation   Patient does not have RUR score due to observation status, letter given on admission  Covid test: negative 12/6  Chart reviewed--patient discussed at IDR rounds  Noted patient has a discharge order today. She did not qualify for home oxygen and does not have any noted needs from ; plan is for her to return home with family assistance. OK for discharge from  standpoint. Transition of Care Plan   1. Discharge today, order in   2. Home with family assistance  3. Family will transport  4. Follow up with PCP, specialties as needed  5.  OK for discharge from [facility], MSW

## 2021-12-08 NOTE — PROGRESS NOTES
I have reviewed discharge instructions with the patient and spouse. The patient and spouse verbalized understanding. Discharge medications reviewed with patient and spouse and appropriate educational materials and side effects teaching were provided. AVS signed and placed in patient chart.

## 2021-12-08 NOTE — DISCHARGE INSTRUCTIONS
HOSPITALIST DISCHARGE INSTRUCTIONS  NAME: Ismael Hernández   :  1985   MRN:  [id number]     Date/Time:  2021 11:29 AM    ADMIT DATE: 2021     DISCHARGE DATE: 2021     ADMITTING DIAGNOSIS:  Asthma attack, viral infection. COVID negative     DISCHARGE DIAGNOSIS:  same    MEDICATIONS:  See after visit summary       · It is important that you take the medication exactly as they are prescribed. · Keep your medication in the bottles provided by the pharmacist and keep a list of the medication names, dosages, and times to be taken in your wallet. · Do not take other medications without consulting your doctor     Pain Management: per above medications    What to do at Home    Recommended diet:  Regular Diet    Recommended activity: Activity as tolerated    1) Return to the hospital if you feel worse    2) If you experience any of the following symptoms then please call your primary care physician or return to the emergency room if you cannot get hold of your doctor:  Fever, chills, nausea, vomiting, diarrhea, change in mentation, falling, bleeding, shortness of breath, chest pain, severe headache, severe abdominal pain,     3) Follow up with your doctors     Follow Up: Follow-up Information     Follow up With Specialties Details Why Contact Info    Makenna Palacios NP Nurse Practitioner Schedule an appointment as soon as possible for a visit in 5 days  [address]      Tomeka Alcantara MD Pulmonary Disease Schedule an appointment as soon as possible for a visit in 2 weeks  [address]  [facility]  [phone]              Information obtained by :  I understand that if any problems occur once I am at home I am to contact my physician. I understand and acknowledge receipt of the instructions indicated above. Physician's or R.N.'s Signature                                                                  Date/Time                                                                                                                                              Patient or Representative Signature                                                          Date/Time      Patient Education        Asthma Attack: Care Instructions  Your Care Instructions     During an asthma attack, the airways swell and narrow. This makes it hard to breathe. Severe asthma attacks can be life-threatening, but you can help prevent them by keeping your asthma under control and treating symptoms before they get bad. Symptoms include being short of breath, having chest tightness, coughing, and wheezing. Noting and treating these symptoms can also help you avoid future trips to the emergency room. The doctor has checked you carefully, but problems can develop later. If you notice any problems or new symptoms, get medical treatment right away. Follow-up care is a key part of your treatment and safety. Be sure to make and go to all appointments, and call your doctor if you are having problems. It's also a good idea to know your test results and keep a list of the medicines you take. How can you care for yourself at home? · Follow your asthma action plan to prevent and treat attacks. If you don't have an asthma action plan, work with your doctor to create one. · Take your asthma medicines exactly as prescribed. Talk to your doctor right away if you have any questions about how to take them. ? Use your quick-relief medicine when you have symptoms of an attack. Quick-relief medicine is usually an albuterol inhaler. Some people need to use quick-relief medicine before they exercise. ? Take your controller medicine every day, not just when you have symptoms. Controller medicine is usually an inhaled corticosteroid.  The goal is to prevent problems before they occur. Don't use your controller medicine to treat an attack that has already started. It doesn't work fast enough to help. ? If your doctor prescribed corticosteroid pills to use during an attack, take them exactly as prescribed. It may take hours for the pills to work, but they may make the episode shorter and help you breathe better. ? Keep your quick-relief medicine with you at all times. · Talk to your doctor before using other medicines. Some medicines, such as aspirin, can cause asthma attacks in some people. · If you have a peak flow meter, use it to check how well you are breathing. This can help you predict when an asthma attack is going to occur. Then you can take medicine to prevent the asthma attack or make it less severe. · Do not smoke or allow others to smoke around you. Avoid smoky places. Smoking makes asthma worse. If you need help quitting, talk to your doctor about stop-smoking programs and medicines. These can increase your chances of quitting for good. · Learn what triggers an asthma attack for you, and avoid the triggers when you can. Common triggers include colds, smoke, air pollution, dust, pollen, mold, pets, cockroaches, stress, and cold air. · Avoid colds and the flu. Get a pneumococcal vaccine shot. If you have had one before, ask your doctor if you need a second dose. Get a flu vaccine every fall. If you must be around people with colds or the flu, wash your hands often. When should you call for help? [id number] anytime you think you may need emergency care. For example, call if:  · You have severe trouble breathing. Call your doctor now or seek immediate medical care if:  · Your symptoms do not get better after you have followed your asthma action plan. · You have new or worse trouble breathing. · Your coughing and wheezing get worse. · You cough up dark brown or bloody mucus (sputum). · You have a new or higher fever.   Watch closely for changes in your health, and be sure to contact your doctor if:  · You need to use quick-relief medicine on more than 2 days a week (unless it is just for exercise). · You cough more deeply or more often, especially if you notice more mucus or a change in the color of your mucus. · You are not getting better as expected. Where can you learn more? Go to http://www.grey.com/  Enter U128 in the search box to learn more about \"Asthma Attack: Care Instructions. \"  Current as of: February 24, 2020               Content Version: 12.5  © 9409-2220 Healthwise, [employer]. Care instructions adapted under license by [employer] (which disclaims liability or warranty for this information). If you have questions about a medical condition or this instruction, always ask your healthcare professional. [address] any warranty or liability for your use of this information.

## 2021-12-08 NOTE — PROGRESS NOTES
Bedside shift change report given to Nia Pugh RN (oncoming nurse) by Yumi Álvarez RN (offgoing nurse). Report included the following information SBAR, ED Summary and Recent Results.

## 2021-12-08 NOTE — PROGRESS NOTES
[address] December 8, 2021       RE: Ismael Hernández      To Whom It May Concern,    This is to certify that Ismael Hernández was under my care at [facility] from 12/06 to 12/08. Please excuse Ms. Bergeron from work for 1 week    Please feel free to contact my office if you have any questions or concerns. Thank you for your assistance in this matter.       Sincerely,  Oneyda Ronquillo MD   [phone]

## 2021-12-08 NOTE — DISCHARGE SUMMARY
Sha [facility]endezelsen [facility]  [address] [facility], [address] [place] [address]  [phone]    Physician Discharge Summary     Patient ID:  Shelby Ferraro  [id number]  89 y.o.  1985    Admit date: 12/6/2021    Discharge date and time: 12/8/2021 11:30 AM    Admission Diagnoses: Asthma [J45.909]    Discharge Diagnoses:  Principal Diagnosis Asthma exacerbation                                            Principal Problem:    Asthma exacerbation ()    Active Problems:    GERD (gastroesophageal reflux disease) ()      Tachypnea (12/6/2021)      Dyspnea (12/6/2021)      Leukocytosis (12/6/2021)      Asthma (12/6/2021)           Hospital Course:     40 yo hx of asthma, presented w/ dyspnea, asthma exacerbation     1) Asthma exacerbation/bronchospasm/dyspnea: much improved. Chest CT unremarkable. Viral panel/COVID neg. Pulm was following. Will change IV solumedrol to prednisone taper. Cont LABA/ICS, nebs prn     2) Leukocytosis: due to steroids     3) GERD: cont PPI     4) Intermittent tachycardia: recently saw Cards.   Echo normal.  Chest CT neg PE    PCP: Lino Gavin, NP     Consults: Pulmonary/Intensive care    Significant Diagnostic Studies: none    Discharge Exam:  Physical Exam:    Gen: Well-developed, well-nourished, in no acute distress  HEENT:  Pink conjunctivae, PERRL, hearing intact to voice, moist mucous membranes  Neck: Supple, without masses, thyroid non-tender  Resp: No accessory muscle use, minimal wheezing  Card: No murmurs, normal S1, S2 without thrills, bruits or peripheral edema  Abd:  Soft, non-tender, non-distended, normoactive bowel sounds are present  Lymph:  No cervical or inguinal adenopathy  Musc: No cyanosis or clubbing  Skin: No rashes  Neuro:  Cranial nerves are grossly intact, no focal motor weakness, follows commands appropriately  Psych:  Good insight, oriented to person, place and time, alert    Disposition: home  Discharge Condition: Stable    Patient Instructions: Current Discharge Medication List      START taking these medications    Details   albuterol-ipratropium (DUO-NEB) 2.5 mg-0.5 mg/3 ml nebu 3 mL by Nebulization route every six (6) hours as needed for Wheezing or Shortness of Breath. Indications: bronchi muscle spasm resulting from COPD  Qty: 30 Nebule, Refills: 1      benzonatate (TESSALON) 100 mg capsule Take 1 Capsule by mouth three (3) times daily as needed for Cough for up to 7 days. Qty: 21 Capsule, Refills: 0      predniSONE (DELTASONE) 10 mg tablet Take 40mg (4 tabs) daily for 3 days, then 20mg (2 tabs) daily for 3 days, then 10mg (1 tab) daily for 3 days  Qty: 21 Tablet, Refills: 0         CONTINUE these medications which have NOT CHANGED    Details   budesonide-formoteroL (Symbicort) 160-4.5 mcg/actuation HFAA Take 2 Puffs by inhalation two (2) times a day. cetirizine-pseudoePHEDrine (ZyrTEC-D) 5-120 mg Tb12 Take 1 Tablet by mouth once over twenty-four (24) hours. azelastine (ASTEPRO) 205.5 mcg (0.15 %) 2 Sprays two (2) times a day. zafirlukast (ACCOLATE) 20 mg tablet Take 20 mg by mouth two (2) times a day. pantoprazole (PROTONIX) 40 mg tablet Take 40 mg by mouth daily. famotidine (PEPCID) 10 mg tablet Take 10 mg by mouth daily. norgestimate-ethinyl estradiol (ORTHO-CYCLEN, SPRINTEC) 0.25-35 mg-mcg tab Take 1 Tab by mouth daily. triamcinolone (NASACORT) 55 mcg nasal inhaler 2 Sprays daily. guaiFENesin-dextromethorphan SR (MUCINEX DM) 600-30 mg per tablet Take 1 Tab by mouth two (2) times a day. albuterol sulfate 90 mcg/actuation aepb Take 2 Puffs by inhalation every four (4) hours as needed for Cough.   Qty: 1 Inhaler, Refills: 0           Activity: Activity as tolerated  Diet: Regular Diet  Wound Care: None needed    Follow-up with  Follow-up Information     Follow up With Specialties Details Why Contact Info    [facility] NP Nurse Practitioner Schedule an appointment as soon as possible for a visit in 5 days  [address] [facility] [address] [facility]      Sony Almaraz MD Pulmonary Disease Schedule an appointment as soon as possible for a visit in 2 weeks  [address] [place] [address] [place]  [phone]            Follow-up tests/labs none    Signed:  Viviana Dakin, MD  12/8/2021  11:30 AM  **I personally spent 32 min on discharge**

## 2021-12-08 NOTE — PROGRESS NOTES
[facility]     Name: Patricio Manley MRN: [id number]   : 1985 Hospital: [address]   Date: 2021        Impression Plan   1. Asthma exacerbation  2. GERD  3. Environmental allergies               · Continue IV solumedrol - can switch to prednisone taper at discharge  · Pulmicort/Brovana; resume Symbicort at discharge  · Continue Duonebs  · Continue home famotidine/PPI  · OOB into chair as much as possible    Patient is stable from a pulmonary standpoint. We will sign off and arrange for outpatient pulmonary follow up in 1-2 weeks. Please call with questions. Radiology  (personally reviewed) CTA chest: no infiltrates, no PE       Subjective     Cc: shortness of breath    40 yo with PMHx of asthma presenting for increasng shortness of breath. Sees Dr. Jazmine Mclaughlin as outpatient and last week received IM steroid along with steroid taper, however did not improve. Denies sick contacts. GERD is controlled and allergies have been controlled with zafir lukast. As outpatient she is managed on Symbicort 160/Spiriva and xopenex. Non-smoker. Interval history  Afebrile  VSS  Sats 97% on RA  Did not require O2 during RT eval; HR was elevated  RVP neg    ROS: Feeling better. No longer wheezing. BRAXTON improved. Still with some tachycardia. Denies abd pain. Denies LE pain/swelling. Past Medical History:   Diagnosis Date    Asthma     GERD (gastroesophageal reflux disease)       No past surgical history on file. Prior to Admission medications    Medication Sig Start Date End Date Taking? Authorizing Provider   budesonide-formoteroL (Symbicort) 160-4.5 mcg/actuation HFAA Take 2 Puffs by inhalation two (2) times a day. Yes Provider, Historical   cetirizine-pseudoePHEDrine (ZyrTEC-D) 5-120 mg Tb12 Take 1 Tablet by mouth once over twenty-four (24) hours. Yes Provider, Historical   azelastine (ASTEPRO) 205.5 mcg (0.15 %) 2 Sprays two (2) times a day.    Yes Provider, Historical   zafirlukast (ACCOLATE) 20 mg tablet Take 20 mg by mouth two (2) times a day. Yes Provider, Historical   pantoprazole (PROTONIX) 40 mg tablet Take 40 mg by mouth daily. Provider, Historical   famotidine (PEPCID) 10 mg tablet Take 10 mg by mouth daily. Provider, Historical   norgestimate-ethinyl estradiol (ORTHO-CYCLEN, SPRINTEC) 0.25-35 mg-mcg tab Take 1 Tab by mouth daily. Provider, Historical   triamcinolone (NASACORT) 55 mcg nasal inhaler 2 Sprays daily. Provider, Historical   guaiFENesin-dextromethorphan SR (MUCINEX DM) 600-30 mg per tablet Take 1 Tab by mouth two (2) times a day. Provider, Historical   albuterol sulfate 90 mcg/actuation aepb Take 2 Puffs by inhalation every four (4) hours as needed for Cough.  10/17/19   Omar Ramos MD     Current Facility-Administered Medications   Medication Dose Route Frequency    albuterol-ipratropium (DUO-NEB) 2.5 MG-0.5 MG/3 ML  3 mL Nebulization Q6H RT    guaiFENesin-dextromethorphan (ROBITUSSIN DM) 100-10 mg/5 mL syrup 10 mL  10 mL Oral Q6H    arformoterol 15 mcg/budesonide 0.5 mg neb solution   Nebulization BID RT    methylPREDNISolone (PF) (SOLU-MEDROL) injection 40 mg  40 mg IntraVENous Q6H    sodium chloride (NS) flush 5-40 mL  5-40 mL IntraVENous Q8H    enoxaparin (LOVENOX) injection 40 mg  40 mg SubCUTAneous DAILY    pantoprazole (PROTONIX) tablet 40 mg  40 mg Oral DAILY    fluticasone propionate (FLONASE) 50 mcg/actuation nasal spray 2 Spray  2 Spray Both Nostrils DAILY    zafirlukast (ACCOLATE) tablet 20 mg  20 mg Oral BID    famotidine (PF) (PEPCID) 20 mg in 0.9% sodium chloride 10 mL injection  20 mg IntraVENous Q12H     Allergies   Allergen Reactions    Latex Other (comments)     rash    Galactose-Alpha-1,3-Galactose (Alpha-Gal) Anaphylaxis     Hives, gi upset    Phenazopyridine Other (comments)     Low bp, tacycardic, asthmatic flare    Sulfamethoxazole-Trimethoprim Other (comments)     Low bp, thacycardic, asthmatic flare      Social History     Tobacco Use    Smoking status: Never Smoker    Smokeless tobacco: Never Used   Substance Use Topics    Alcohol use: Not on file      No family history on file. Laboratory: I have personally reviewed the flowsheet and labs.      Recent Labs     12/07/21  0358 12/06/21  1310   WBC 12.0* 14.6*   HGB 12.8 14.4   HCT 39.0 42.5   * 511*     Recent Labs     12/07/21  0358 12/06/21  1310    138   K 4.2 3.9    107   CO2 24 22   * 117*   BUN 12 15   CREA 0.67 0.86   CA 8.9 9.1   MG 2.3  --    PHOS 2.6  --    ALB  --  3.6   ALT  --  15       Objective:     Visit Vitals  /75 (BP 1 Location: Right upper arm, BP Patient Position: At rest)   Pulse 98   Temp 98.2 °F (36.8 °C)   Resp 18   Ht 5' (1.524 m)   Wt 64.5 kg (142 lb 3.2 oz)   SpO2 97%   BMI 27.77 kg/m²         Intake/Output Summary (Last 24 hours) at 12/8/2021 1045  Last data filed at 12/7/2021 2150  Gross per 24 hour   Intake 120 ml   Output    Net 120 ml     EXAM:   GENERAL: Awake,alert, hoarse voice, HEENT:  anicteric, EOMI, no alar flaring or epistaxis, oral mucosa moist without cyanosis, NECK:  no jugular vein distention, no retractions, no thyromegaly or masses, LUNGS: CTA, no w/r/r, no w/r/r HEART:  Regular rate and rhythm with no MGR; no edema is present, ABDOMEN:  soft with no tenderness, bowel sounds present, EXTREMITIES:  warm with no cyanosis, SKIN:  no jaundice or ecchymosis and NEUROLOGIC:  alert and oriented, grossly non-focal    SUSANNE Ortiz  [facility]

## 2021-12-08 NOTE — PROGRESS NOTES
Bedside shift change report given to Stormy Sarabia RN (oncoming nurse) by Carlo Orozco RN (offgoing nurse). Report included the following information SBAR, Kardex, Intake/Output, MAR and Recent Results.

## 2022-01-18 ENCOUNTER — HOSPITAL ENCOUNTER (INPATIENT)
Age: 37
LOS: 6 days | Discharge: HOME | DRG: 202 | End: 2022-01-24
Attending: EMERGENCY MEDICINE | Admitting: FAMILY MEDICINE
Payer: COMMERCIAL

## 2022-01-18 ENCOUNTER — APPOINTMENT (OUTPATIENT)
Dept: CT IMAGING | Age: 37
DRG: 202 | End: 2022-01-18
Attending: EMERGENCY MEDICINE
Payer: COMMERCIAL

## 2022-01-18 DIAGNOSIS — F41.9 ANXIETY: ICD-10-CM

## 2022-01-18 DIAGNOSIS — R06.02 SOB (SHORTNESS OF BREATH): Primary | ICD-10-CM

## 2022-01-18 DIAGNOSIS — R00.0 TACHYCARDIA: ICD-10-CM

## 2022-01-18 DIAGNOSIS — J45.909 ASTHMA, UNSPECIFIED ASTHMA SEVERITY, UNSPECIFIED WHETHER COMPLICATED, UNSPECIFIED WHETHER PERSISTENT: ICD-10-CM

## 2022-01-18 LAB
ALBUMIN SERPL-MCNC: 3.6 G/DL (ref 3.5–5)
ALBUMIN/GLOB SERPL: 1.2 {RATIO} (ref 1.1–2.2)
ALP SERPL-CCNC: 29 U/L (ref 45–117)
ALT SERPL-CCNC: 28 U/L (ref 12–78)
ANION GAP SERPL CALC-SCNC: 4 MMOL/L (ref 5–15)
ARTERIAL PATENCY WRIST A: POSITIVE
AST SERPL-CCNC: 12 U/L (ref 15–37)
ATRIAL RATE: 114 BPM
ATRIAL RATE: 135 BPM
B PERT DNA SPEC QL NAA+PROBE: NOT DETECTED
BASE DEFICIT BLD-SCNC: 3.9 MMOL/L
BASOPHILS # BLD: 0 K/UL (ref 0–0.1)
BASOPHILS NFR BLD: 0 % (ref 0–1)
BDY SITE: ABNORMAL
BILIRUB SERPL-MCNC: 0.3 MG/DL (ref 0.2–1)
BORDETELLA PARAPERTUSSIS PCR, BORPAR: NOT DETECTED
BUN SERPL-MCNC: 20 MG/DL (ref 6–20)
BUN/CREAT SERPL: 24 (ref 12–20)
C PNEUM DNA SPEC QL NAA+PROBE: NOT DETECTED
CALCIUM SERPL-MCNC: 9 MG/DL (ref 8.5–10.1)
CALCULATED P AXIS, ECG09: 47 DEGREES
CALCULATED P AXIS, ECG09: 56 DEGREES
CALCULATED R AXIS, ECG10: 28 DEGREES
CALCULATED R AXIS, ECG10: 73 DEGREES
CALCULATED T AXIS, ECG11: 31 DEGREES
CALCULATED T AXIS, ECG11: 43 DEGREES
CHLORIDE SERPL-SCNC: 106 MMOL/L (ref 97–108)
CO2 SERPL-SCNC: 29 MMOL/L (ref 21–32)
COMMENT, HOLDF: NORMAL
COVID-19 RAPID TEST, COVR: NOT DETECTED
CREAT SERPL-MCNC: 0.82 MG/DL (ref 0.55–1.02)
D DIMER PPP FEU-MCNC: 0.74 MG/L FEU (ref 0–0.65)
DIAGNOSIS, 93000: NORMAL
DIAGNOSIS, 93000: NORMAL
DIFFERENTIAL METHOD BLD: ABNORMAL
EOSINOPHIL # BLD: 0 K/UL (ref 0–0.4)
EOSINOPHIL NFR BLD: 0 % (ref 0–7)
ERYTHROCYTE [DISTWIDTH] IN BLOOD BY AUTOMATED COUNT: 14.6 % (ref 11.5–14.5)
FLUAV H1 2009 PAND RNA SPEC QL NAA+PROBE: NOT DETECTED
FLUAV H1 RNA SPEC QL NAA+PROBE: NOT DETECTED
FLUAV H3 RNA SPEC QL NAA+PROBE: NOT DETECTED
FLUAV SUBTYP SPEC NAA+PROBE: NOT DETECTED
FLUBV RNA SPEC QL NAA+PROBE: NOT DETECTED
GAS FLOW.O2 O2 DELIVERY SYS: ABNORMAL L/MIN
GLOBULIN SER CALC-MCNC: 3 G/DL (ref 2–4)
GLUCOSE SERPL-MCNC: 85 MG/DL (ref 65–100)
HADV DNA SPEC QL NAA+PROBE: NOT DETECTED
HCG UR QL: NEGATIVE
HCO3 BLD-SCNC: 20.2 MMOL/L (ref 22–26)
HCOV 229E RNA SPEC QL NAA+PROBE: NOT DETECTED
HCOV HKU1 RNA SPEC QL NAA+PROBE: NOT DETECTED
HCOV NL63 RNA SPEC QL NAA+PROBE: NOT DETECTED
HCOV OC43 RNA SPEC QL NAA+PROBE: NOT DETECTED
HCT VFR BLD AUTO: 43 % (ref 35–47)
HGB BLD-MCNC: 14.2 G/DL (ref 11.5–16)
HMPV RNA SPEC QL NAA+PROBE: NOT DETECTED
HPIV1 RNA SPEC QL NAA+PROBE: NOT DETECTED
HPIV2 RNA SPEC QL NAA+PROBE: NOT DETECTED
HPIV3 RNA SPEC QL NAA+PROBE: NOT DETECTED
HPIV4 RNA SPEC QL NAA+PROBE: NOT DETECTED
IMM GRANULOCYTES # BLD AUTO: 0 K/UL
IMM GRANULOCYTES NFR BLD AUTO: 0 %
LYMPHOCYTES # BLD: 1.9 K/UL (ref 0.8–3.5)
LYMPHOCYTES NFR BLD: 13 % (ref 12–49)
M PNEUMO DNA SPEC QL NAA+PROBE: NOT DETECTED
MCH RBC QN AUTO: 28.6 PG (ref 26–34)
MCHC RBC AUTO-ENTMCNC: 33 G/DL (ref 30–36.5)
MCV RBC AUTO: 86.7 FL (ref 80–99)
METAMYELOCYTES NFR BLD MANUAL: 3 %
MONOCYTES # BLD: 1.3 K/UL (ref 0–1)
MONOCYTES NFR BLD: 9 % (ref 5–13)
MYELOCYTES NFR BLD MANUAL: 1 %
NEUTS BAND NFR BLD MANUAL: 2 % (ref 0–6)
NEUTS SEG # BLD: 10.6 K/UL (ref 1.8–8)
NEUTS SEG NFR BLD: 72 % (ref 32–75)
NRBC # BLD: 0 K/UL (ref 0–0.01)
NRBC BLD-RTO: 0 PER 100 WBC
P-R INTERVAL, ECG05: 128 MS
P-R INTERVAL, ECG05: 128 MS
PCO2 BLD: 33.2 MMHG (ref 35–45)
PH BLD: 7.39 [PH] (ref 7.35–7.45)
PLATELET # BLD AUTO: 318 K/UL (ref 150–400)
PMV BLD AUTO: 9 FL (ref 8.9–12.9)
PO2 BLD: 87 MMHG (ref 80–100)
POTASSIUM SERPL-SCNC: 4 MMOL/L (ref 3.5–5.1)
PROT SERPL-MCNC: 6.6 G/DL (ref 6.4–8.2)
Q-T INTERVAL, ECG07: 274 MS
Q-T INTERVAL, ECG07: 294 MS
QRS DURATION, ECG06: 72 MS
QRS DURATION, ECG06: 74 MS
QTC CALCULATION (BEZET), ECG08: 405 MS
QTC CALCULATION (BEZET), ECG08: 411 MS
RBC # BLD AUTO: 4.96 M/UL (ref 3.8–5.2)
RBC MORPH BLD: ABNORMAL
RSV RNA SPEC QL NAA+PROBE: NOT DETECTED
RV+EV RNA SPEC QL NAA+PROBE: NOT DETECTED
SAMPLES BEING HELD,HOLD: NORMAL
SAO2 % BLD: 96.6 % (ref 92–97)
SARS-COV-2 PCR, COVPCR: NOT DETECTED
SODIUM SERPL-SCNC: 139 MMOL/L (ref 136–145)
SOURCE, COVRS: NORMAL
SPECIMEN TYPE: ABNORMAL
T4 FREE SERPL-MCNC: 1.1 NG/DL (ref 0.8–1.5)
TROPONIN-HIGH SENSITIVITY: 11 NG/L (ref 0–51)
TROPONIN-HIGH SENSITIVITY: 12 NG/L (ref 0–51)
TSH SERPL DL<=0.05 MIU/L-ACNC: 0.95 UIU/ML (ref 0.36–3.74)
VENTRICULAR RATE, ECG03: 114 BPM
VENTRICULAR RATE, ECG03: 135 BPM
WBC # BLD AUTO: 14.3 K/UL (ref 3.6–11)
WBC MORPH BLD: ABNORMAL

## 2022-01-18 PROCEDURE — 74011250636 HC RX REV CODE- 250/636: Performed by: EMERGENCY MEDICINE

## 2022-01-18 PROCEDURE — 65660000000 HC RM CCU STEPDOWN

## 2022-01-18 PROCEDURE — 94640 AIRWAY INHALATION TREATMENT: CPT

## 2022-01-18 PROCEDURE — 87635 SARS-COV-2 COVID-19 AMP PRB: CPT

## 2022-01-18 PROCEDURE — 85025 COMPLETE CBC W/AUTO DIFF WBC: CPT

## 2022-01-18 PROCEDURE — 81025 URINE PREGNANCY TEST: CPT

## 2022-01-18 PROCEDURE — 94664 DEMO&/EVAL PT USE INHALER: CPT

## 2022-01-18 PROCEDURE — 0202U NFCT DS 22 TRGT SARS-COV-2: CPT

## 2022-01-18 PROCEDURE — 36600 WITHDRAWAL OF ARTERIAL BLOOD: CPT

## 2022-01-18 PROCEDURE — 93005 ELECTROCARDIOGRAM TRACING: CPT

## 2022-01-18 PROCEDURE — 85379 FIBRIN DEGRADATION QUANT: CPT

## 2022-01-18 PROCEDURE — 82803 BLOOD GASES ANY COMBINATION: CPT

## 2022-01-18 PROCEDURE — 84443 ASSAY THYROID STIM HORMONE: CPT

## 2022-01-18 PROCEDURE — 74011250636 HC RX REV CODE- 250/636: Performed by: FAMILY MEDICINE

## 2022-01-18 PROCEDURE — 80053 COMPREHEN METABOLIC PANEL: CPT

## 2022-01-18 PROCEDURE — 71275 CT ANGIOGRAPHY CHEST: CPT

## 2022-01-18 PROCEDURE — 74011250637 HC RX REV CODE- 250/637: Performed by: FAMILY MEDICINE

## 2022-01-18 PROCEDURE — 96361 HYDRATE IV INFUSION ADD-ON: CPT

## 2022-01-18 PROCEDURE — 84484 ASSAY OF TROPONIN QUANT: CPT

## 2022-01-18 PROCEDURE — 99285 EMERGENCY DEPT VISIT HI MDM: CPT

## 2022-01-18 PROCEDURE — 96360 HYDRATION IV INFUSION INIT: CPT

## 2022-01-18 PROCEDURE — 74011000636 HC RX REV CODE- 636: Performed by: EMERGENCY MEDICINE

## 2022-01-18 PROCEDURE — 84439 ASSAY OF FREE THYROXINE: CPT

## 2022-01-18 PROCEDURE — 36415 COLL VENOUS BLD VENIPUNCTURE: CPT

## 2022-01-18 PROCEDURE — 74011000250 HC RX REV CODE- 250: Performed by: EMERGENCY MEDICINE

## 2022-01-18 RX ORDER — SODIUM CHLORIDE 0.9 % (FLUSH) 0.9 %
5-40 SYRINGE (ML) INJECTION EVERY 8 HOURS
Status: DISCONTINUED | OUTPATIENT
Start: 2022-01-18 | End: 2022-01-24 | Stop reason: HOSPADM

## 2022-01-18 RX ORDER — AZELASTINE 1 MG/ML
1 SPRAY, METERED NASAL 2 TIMES DAILY
Status: DISCONTINUED | OUTPATIENT
Start: 2022-01-19 | End: 2022-01-18

## 2022-01-18 RX ORDER — SODIUM CHLORIDE 0.9 % (FLUSH) 0.9 %
5-40 SYRINGE (ML) INJECTION AS NEEDED
Status: DISCONTINUED | OUTPATIENT
Start: 2022-01-18 | End: 2022-01-24 | Stop reason: HOSPADM

## 2022-01-18 RX ORDER — HYDROXYZINE 25 MG/1
50 TABLET, FILM COATED ORAL
Status: ON HOLD | COMMUNITY
End: 2022-01-24 | Stop reason: SDUPTHER

## 2022-01-18 RX ORDER — HYDROXYZINE 25 MG/1
50 TABLET, FILM COATED ORAL
Status: DISCONTINUED | OUTPATIENT
Start: 2022-01-18 | End: 2022-01-22

## 2022-01-18 RX ORDER — ACETAMINOPHEN 325 MG/1
650 TABLET ORAL
Status: DISCONTINUED | OUTPATIENT
Start: 2022-01-18 | End: 2022-01-24 | Stop reason: HOSPADM

## 2022-01-18 RX ORDER — IPRATROPIUM BROMIDE AND ALBUTEROL SULFATE 2.5; .5 MG/3ML; MG/3ML
3 SOLUTION RESPIRATORY (INHALATION)
Status: COMPLETED | OUTPATIENT
Start: 2022-01-18 | End: 2022-01-18

## 2022-01-18 RX ORDER — LEVOCETIRIZINE DIHYDROCHLORIDE 5 MG/1
5 TABLET, FILM COATED ORAL
COMMUNITY

## 2022-01-18 RX ORDER — MONTELUKAST SODIUM 10 MG/1
10 TABLET ORAL
Status: DISCONTINUED | OUTPATIENT
Start: 2022-01-18 | End: 2022-01-24 | Stop reason: HOSPADM

## 2022-01-18 RX ORDER — IPRATROPIUM BROMIDE AND ALBUTEROL SULFATE 2.5; .5 MG/3ML; MG/3ML
3 SOLUTION RESPIRATORY (INHALATION)
Status: DISCONTINUED | OUTPATIENT
Start: 2022-01-18 | End: 2022-01-24 | Stop reason: HOSPADM

## 2022-01-18 RX ORDER — FLUTICASONE PROPIONATE 50 MCG
2 SPRAY, SUSPENSION (ML) NASAL DAILY
Status: DISCONTINUED | OUTPATIENT
Start: 2022-01-19 | End: 2022-01-24 | Stop reason: HOSPADM

## 2022-01-18 RX ORDER — PANTOPRAZOLE SODIUM 40 MG/1
40 TABLET, DELAYED RELEASE ORAL 2 TIMES DAILY
Status: DISCONTINUED | OUTPATIENT
Start: 2022-01-18 | End: 2022-01-24 | Stop reason: HOSPADM

## 2022-01-18 RX ORDER — PANTOPRAZOLE SODIUM 40 MG/1
40 TABLET, DELAYED RELEASE ORAL 2 TIMES DAILY
Status: DISCONTINUED | OUTPATIENT
Start: 2022-01-19 | End: 2022-01-18

## 2022-01-18 RX ORDER — SODIUM CHLORIDE 9 MG/ML
75 INJECTION, SOLUTION INTRAVENOUS CONTINUOUS
Status: DISCONTINUED | OUTPATIENT
Start: 2022-01-18 | End: 2022-01-20

## 2022-01-18 RX ORDER — AZELASTINE 1 MG/ML
1 SPRAY, METERED NASAL 2 TIMES DAILY
Status: DISCONTINUED | OUTPATIENT
Start: 2022-01-19 | End: 2022-01-24 | Stop reason: HOSPADM

## 2022-01-18 RX ORDER — ONDANSETRON 2 MG/ML
4 INJECTION INTRAMUSCULAR; INTRAVENOUS
Status: DISCONTINUED | OUTPATIENT
Start: 2022-01-18 | End: 2022-01-24 | Stop reason: HOSPADM

## 2022-01-18 RX ADMIN — SODIUM CHLORIDE 75 ML/HR: 9 INJECTION, SOLUTION INTRAVENOUS at 19:14

## 2022-01-18 RX ADMIN — SODIUM CHLORIDE 1000 ML: 9 INJECTION, SOLUTION INTRAVENOUS at 14:03

## 2022-01-18 RX ADMIN — IOPAMIDOL 70 ML: 755 INJECTION, SOLUTION INTRAVENOUS at 14:33

## 2022-01-18 RX ADMIN — AZITHROMYCIN DIHYDRATE 500 MG: 500 INJECTION, POWDER, LYOPHILIZED, FOR SOLUTION INTRAVENOUS at 19:14

## 2022-01-18 RX ADMIN — METHYLPREDNISOLONE SODIUM SUCCINATE 60 MG: 125 INJECTION, POWDER, FOR SOLUTION INTRAMUSCULAR; INTRAVENOUS at 21:02

## 2022-01-18 RX ADMIN — IPRATROPIUM BROMIDE AND ALBUTEROL SULFATE 3 ML: .5; 3 SOLUTION RESPIRATORY (INHALATION) at 14:45

## 2022-01-18 RX ADMIN — MONTELUKAST 10 MG: 10 TABLET, FILM COATED ORAL at 21:02

## 2022-01-18 NOTE — PROGRESS NOTES
Pulmonary     Pt with severe corticosteroid dependent asthma admitted with an exacerbation of asthma. Covid rapid negative. CTA no ggo or asd or pe. Cbc no peripheral eosinophilia. I agree with the current treatment plan of iv steroids , scheduled duonebs and pulmicort nebs. Will need follow up in [facility] office. We can see immediately after discharge.  [phone]    Will see as needed in the hospital

## 2022-01-18 NOTE — ED TRIAGE NOTES
Patient reports she has been having problems breathing since Thanksgiving. She is on high dose steroid without relief. Used neb his morning horse and short of breath in triage.

## 2022-01-18 NOTE — H&P
[address] [facility] Adult  Hospitalist Group  History and Physical    Date of Service:  1/18/2022  Primary Care Provider: Valerio Shafer NP  Source of information: The patient    Chief Complaint: Rapid Heart Rate and Shortness of Breath      History of Presenting Illness:   Shamar Sender is a 39 y.o. female who presents with SOB    Patient presents to the hospital complaining of shortness of breath, patient reports that she has been having shortness of breath since Thanksgiving, patient reports that she has been seen by pulmonary group, patient reports she has history of asthma, is on steroids but continues to have shortness of breath, patient came to the ER, was found to be tachycardic, was found to be wheezing, reports that this morning her shortness of breath got worse and she had some hoarse voice, patient was seen in the ER and was requested to be admitted to the hospitalist service, patient reports that she has been taking her medications on a regular basis and denies any other complaints or problems    The patient denies any headache, blurry vision, sore throat, trouble swallowing, trouble with speech, chest pain,  fever, chills, N/V/D, abd pain, urinary symptoms, constipation, recent travels, sick contacts, focal or generalized neurological symptoms, falls, injuries, rashes, contact with COVID-19 diagnosed patients, hematemesis, melena, hemoptysis, hematuria, rashes, denies starting any new medications and denies any other concerns or problems besides as mentioned above. REVIEW OF SYSTEMS:  A comprehensive review of systems was negative except for that written in the History of Present Illness. Past Medical History:   Diagnosis Date    Asthma     GERD (gastroesophageal reflux disease)       No past surgical history on file. Prior to Admission medications    Medication Sig Start Date End Date Taking?  Authorizing Provider   albuterol-ipratropium (DUO-NEB) 2.5 mg-0.5 mg/3 ml nebu 3 mL by Nebulization route every six (6) hours as needed for Wheezing or Shortness of Breath. Indications: bronchi muscle spasm resulting from COPD 12/8/21   Nitin Gutierrez MD   predniSONE (DELTASONE) 10 mg tablet Take 40mg (4 tabs) daily for 3 days, then 20mg (2 tabs) daily for 3 days, then 10mg (1 tab) daily for 3 days 12/8/21   Eladio Gutierrez MD   budesonide-formoteroL (Symbicort) 160-4.5 mcg/actuation HFAA Take 2 Puffs by inhalation two (2) times a day. Provider, Historical   cetirizine-pseudoePHEDrine (ZyrTEC-D) 5-120 mg Tb12 Take 1 Tablet by mouth once over twenty-four (24) hours. Provider, Historical   azelastine (ASTEPRO) 205.5 mcg (0.15 %) 2 Sprays two (2) times a day. Provider, Historical   zafirlukast (ACCOLATE) 20 mg tablet Take 20 mg by mouth two (2) times a day. Provider, Historical   pantoprazole (PROTONIX) 40 mg tablet Take 40 mg by mouth daily. Provider, Historical   famotidine (PEPCID) 10 mg tablet Take 10 mg by mouth daily. Provider, Historical   norgestimate-ethinyl estradiol (ORTHO-CYCLEN, SPRINTEC) 0.25-35 mg-mcg tab Take 1 Tab by mouth daily. Provider, Historical   triamcinolone (NASACORT) 55 mcg nasal inhaler 2 Sprays daily. Provider, Historical   guaiFENesin-dextromethorphan SR (MUCINEX DM) 600-30 mg per tablet Take 1 Tab by mouth two (2) times a day. Provider, Historical   albuterol sulfate 90 mcg/actuation aepb Take 2 Puffs by inhalation every four (4) hours as needed for Cough. 10/17/19   Ken Castillo MD     Allergies   Allergen Reactions    Latex Other (comments)     rash    Galactose-Alpha-1,3-Galactose (Alpha-Gal) Anaphylaxis     Hives, gi upset    Phenazopyridine Other (comments)     Low bp, tacycardic, asthmatic flare    Sulfamethoxazole-Trimethoprim Other (comments)     Low bp, thacycardic, asthmatic flare      No family history on file. Social History:  reports that she has never smoked.  She has never used smokeless tobacco.     Family and social history were personally reviewed, all pertinent and relevant details are outlined as above. Objective:     Visit Vitals  /76 (BP 1 Location: Left upper arm, BP Patient Position: At rest)   Pulse (!) 122   Temp 98.1 °F (36.7 °C)   Resp 15   SpO2 98%      O2 Device: None (Room air)    PHYSICAL EXAM:   General: Alert x oriented x 3, awake, mildly distressed, pleasant male  HEENT: PEERL, moist mucus membranes  Neck: Supple,   Chest:  decreased basal breath sounds, bilateral scattered wheezing  CVS: Tachycardic  Abd: Soft, non-tender, non-distended, +bowel sounds   Ext: No clubbing, no cyanosis, no edema  Neuro/Psych: Pleasant mood and affect, no focal neurological deficit  Pulses: 2+, symmetric in all extremities  Skin: Warm,     Data Review: All diagnostic labs and studies have been reviewed. Abnormal Labs Reviewed   CBC WITH AUTOMATED DIFF - Abnormal; Notable for the following components:       Result Value    WBC 14.3 (*)     RDW 14.6 (*)     METAMYELOCYTES 3 (*)     MYELOCYTES 1 (*)     ABS. NEUTROPHILS 10.6 (*)     ABS. MONOCYTES 1.3 (*)     All other components within normal limits   METABOLIC PANEL, COMPREHENSIVE - Abnormal; Notable for the following components:    Anion gap 4 (*)     BUN/Creatinine ratio 24 (*)     AST (SGOT) 12 (*)     Alk. phosphatase 29 (*)     All other components within normal limits   D DIMER - Abnormal; Notable for the following components:    D-dimer 0.74 (*)     All other components within normal limits       All Micro Results     Procedure Component Value Units Date/Time    COVID-19 RAPID TEST [[id number]] Collected: 01/18/22 1116    Order Status: Completed Specimen: Nasopharyngeal Updated: 01/18/22 1145     Specimen source Nasopharyngeal        COVID-19 rapid test Not detected        Comment: Rapid Abbott ID Now       Rapid NAAT:  The specimen is NEGATIVE for SARS-CoV-2, the novel coronavirus associated with COVID-19.        Negative results should be treated as presumptive and, if inconsistent with clinical signs and symptoms or necessary for patient management, should be tested with an alternative molecular assay. Negative results do not preclude SARS-CoV-2 infection and should not be used as the sole basis for patient management decisions. This test has been authorized by the FDA under an Emergency Use Authorization (EUA) for use by authorized laboratories. Fact sheet for Healthcare Providers: ConventionUpdate.co.nz  Fact sheet for Patients: Gamer Guidesdate.co.nz       Methodology: Isothermal Nucleic Acid Amplification               IMAGING:   CTA CHEST W OR W WO CONT   Final Result   1. No pulmonary embolism nor other definite acute finding in the chest.   2.  Nonspecific trace pericardial effusion. 3.  Multiple indeterminate hypodense lesions in the liver not definitely seen on   prior exam. Nonemergent liver mass protocol MRI can be obtained as an outpatient   for further characterization           ECG/ECHO:    Results for orders placed or performed during the hospital encounter of 01/18/22   EKG, 12 LEAD, INITIAL   Result Value Ref Range    Ventricular Rate 135 BPM    Atrial Rate 135 BPM    P-R Interval 128 ms    QRS Duration 72 ms    Q-T Interval 274 ms    QTC Calculation (Bezet) 411 ms    Calculated P Axis 56 degrees    Calculated R Axis 73 degrees    Calculated T Axis 43 degrees    Diagnosis       Sinus tachycardia  Otherwise normal ECG  When compared with ECG of 06-DEC-2021 12:10,  No significant change was found          Assessment:   Given the patient's current clinical presentation, there is a high level of concern for decompensation if discharged from the emergency department. Complex decision making was performed, which includes reviewing the patient's available past medical records, laboratory results, and imaging studies.     Acute hypoxic respiratory failure  Asthma exacerbation  Plan:   Patient will be admitted on a telemetry bed, symptoms likely secondary to  Asthma exacerbation but has been on steroids, outpatient, start patient on IV steroids, empirical antibiotics, pulmonary consult, oxygen support, pulse ox monitoring, ABG, echo and troponin levels, telemetry and further intervention per hospital course, await coronary input and reassess as needed          DVT PROPHYLAXIS: Heparin  FUNCTIONAL STATUS PRIOR TO HOSPITALIZATION: Fully active and ambulatory; able to carry on all self-care without restriction. EARLY MOBILITY ASSESSMENT: Recommend routine ambulation while hospitalized with the assistance of nursing staff  ANTICIPATED DISCHARGE: 24-48 hours. Signed By: Olvin Montero MD     January 18, 2022         Please note that this dictation may have been completed with Dragon, the computer voice recognition software. Quite often unanticipated grammatical, syntax, homophones, and other interpretive errors are inadvertently transcribed by the computer software. Please disregard these errors. Please excuse any errors that have escaped final proofreading.

## 2022-01-18 NOTE — ED PROVIDER NOTES
Date of Service:  1/18/2022    Patient:  Naomy James    Chief Complaint:  Rapid Heart Rate and Shortness of Breath       HPI:  Naomy James is a 39 y.o.  female who presents for evaluation of rapid heartbeat and shortness of breath. Patient had symptoms since Thanksgiving. Is now in the middle of January. Has been following with a cardiologist and a lung doctor. She has been on and off of steroids, currently on a 40-day course of 60 mg of prednisone daily. She states that she is feeling worse. She arrives here tachycardic and tachypneic. She is using all medicines that are provided as prescribed. She states that she cannot do anything without getting extremely winded. She does take birth control, no chance of pregnancy. She does not have any type of chest pain but does note some tightness and feeling like her heart is racing. Otherwise patient denies any types of fevers or chills. She is vaccinated for COVID but is not boosted. She has no nausea or vomiting. No headaches fevers chills. No other acute complaints or modifying factors. Past Medical History:   Diagnosis Date    Asthma     GERD (gastroesophageal reflux disease)        No past surgical history on file. No family history on file.     Social History     Socioeconomic History    Marital status: SINGLE     Spouse name: Not on file    Number of children: Not on file    Years of education: Not on file    Highest education level: Not on file   Occupational History    Not on file   Tobacco Use    Smoking status: Never Smoker    Smokeless tobacco: Never Used   Substance and Sexual Activity    Alcohol use: Not on file    Drug use: Not on file    Sexual activity: Not on file   Other Topics Concern    Not on file   Social History Narrative    Not on file     Social Determinants of Health     Financial Resource Strain:     Difficulty of Paying Living Expenses: Not on file   Food Insecurity:     Worried About Running Out of Food in the Last Year: Not on file    Ran Out of Food in the Last Year: Not on file   Transportation Needs:     Lack of Transportation (Medical): Not on file    Lack of Transportation (Non-Medical): Not on file   Physical Activity:     Days of Exercise per Week: Not on file    Minutes of Exercise per Session: Not on file   Stress:     Feeling of Stress : Not on file   Social Connections:     Frequency of Communication with Friends and Family: Not on file    Frequency of Social Gatherings with Friends and Family: Not on file    Attends Taoist Services: Not on file    Active Member of [address] [place] [address] [employer] or Organizations: Not on file    Attends Club or Organization Meetings: Not on file    Marital Status: Not on file   Intimate Partner Violence:     Fear of Current or Ex-Partner: Not on file    Emotionally Abused: Not on file    Physically Abused: Not on file    Sexually Abused: Not on file   Housing Stability:     Unable to Pay for Housing in the Last Year: Not on file    Number of [place] in the Last Year: Not on file    Unstable Housing in the Last Year: Not on file         ALLERGIES: Latex; Galactose-alpha-1,3-galactose (alpha-gal); Phenazopyridine; and Sulfamethoxazole-trimethoprim    Review of Systems   Respiratory: Positive for chest tightness and shortness of breath. Cardiovascular: Positive for palpitations. All other systems reviewed and are negative. Vitals:    01/18/22 1330 01/18/22 1400 01/18/22 1445 01/18/22 1500   BP: (!) 124/96 (!) 135/104 (!) 122/96 130/76   Pulse: (!) 128 (!) 113 (!) 107 (!) 122   Resp: 26 (!) 32 22 15   Temp:       SpO2: 95% 96% 97% 98%            Physical Exam  Vitals and nursing note reviewed. Constitutional:       Appearance: She is well-developed. She is not ill-appearing. HENT:      Head: Normocephalic and atraumatic. Cardiovascular:      Rate and Rhythm: Regular rhythm. Tachycardia present.    Pulmonary:      Effort: Pulmonary effort is normal. Tachypnea present. Breath sounds: Normal breath sounds. Chest:      Chest wall: No mass or tenderness. Musculoskeletal:      Right lower leg: No tenderness. No edema. Left lower leg: No tenderness. No edema. Skin:     General: Skin is warm. Capillary Refill: Capillary refill takes less than 2 seconds. Neurological:      Mental Status: She is alert and oriented to person, place, and time. [facility]  ED Course as of 01/18/22 1633   Tue Jan 18, 2022   1156 D-dimer(!): 0.74 [GG]   1631 Awaiting consult   [GG]      ED Course User Index  [GG] Markus Vaz DO       VITAL SIGNS:  Patient Vitals for the past 4 hrs:   Temp Pulse Resp BP SpO2   01/18/22 1500  (!) 122 15 130/76 98 %   01/18/22 1445  (!) 107 22 (!) 122/96 97 %   01/18/22 1400  (!) 113 (!) 32 (!) 135/104 96 %   01/18/22 1330  (!) 128 26 (!) 124/96 95 %   01/18/22 1315  (!) 123 15 (!) 122/99 94 %   01/18/22 1245 98.1 °F (36.7 °C) (!) 130 20 (!) 116/97 95 %         LABS:  Recent Results (from the past 6 hour(s))   COVID-19 RAPID TEST    Collection Time: 01/18/22 11:16 AM   Result Value Ref Range    Specimen source Nasopharyngeal      COVID-19 rapid test Not detected NOTD     HCG URINE, QL. - POC    Collection Time: 01/18/22 12:49 PM   Result Value Ref Range    Pregnancy test,urine (POC) Negative NEG     EKG, 12 LEAD, SUBSEQUENT    Collection Time: 01/18/22  2:00 PM   Result Value Ref Range    Ventricular Rate 114 BPM    Atrial Rate 114 BPM    P-R Interval 128 ms    QRS Duration 74 ms    Q-T Interval 294 ms    QTC Calculation (Bezet) 405 ms    Calculated P Axis 47 degrees    Calculated R Axis 28 degrees    Calculated T Axis 31 degrees    Diagnosis       Sinus tachycardia  Otherwise normal ECG  When compared with ECG of 18-JAN-2022 10:11,  MANUAL COMPARISON REQUIRED, DATA IS UNCONFIRMED          IMAGING:  CTA CHEST W OR W WO CONT   Final Result   1.   No pulmonary embolism nor other definite acute finding in the chest. 2.  Nonspecific trace pericardial effusion. 3.  Multiple indeterminate hypodense lesions in the liver not definitely seen on   prior exam. Nonemergent liver mass protocol MRI can be obtained as an outpatient   for further characterization            Medications During Visit:  Medications   iopamidoL (ISOVUE-370) 76 % injection 100 mL (70 mL IntraVENous Given 1/18/22 1433)   sodium chloride 0.9 % bolus infusion 1,000 mL (1,000 mL IntraVENous New Bag 1/18/22 1403)   albuterol-ipratropium (DUO-NEB) 2.5 MG-0.5 MG/3 ML (3 mL Nebulization Given 1/18/22 1445)         DECISION MAKING:  Neftaly Mercado is a 39 y.o. female who comes in as above. Diagnostics as above. Patient continues to have symptoms and tachycardia is unchanged despite medicines provided here. Given the amount of dyspnea she is having and her continued tachycardia, will admit the patient to the hospital.  Pulmonology has been consulted but is yet to call back. IMPRESSION:  1. SOB (shortness of breath)    2. Tachycardia    3. Asthma, unspecified asthma severity, unspecified whether complicated, unspecified whether persistent        DISPOSITION:  Admitted    Procedures        Perfect Serve Consult for Admission  4:33 PM    ED Room Number: ER16/16  Patient Name and age:  Neftaly Mercado 39 y.o.  female  Working Diagnosis:   1. SOB (shortness of breath)    2. Tachycardia    3. Asthma, unspecified asthma severity, unspecified whether complicated, unspecified whether persistent        COVID-19 Suspicion:  no  Sepsis present:  no  Reassessment needed: no  Code Status:  Full Code  Readmission: no  Isolation Requirements:  no  Recommended Level of Care:  telemetry  Department:[facility] Adult ED - 21   Other:  Came in for SOB and asthma and tachycardia. Has followed with pulmonary. No improvement. Awaiting consult back from pulm. Stable. Continues to be tachycardic and increased rate.

## 2022-01-19 LAB
ANION GAP SERPL CALC-SCNC: 10 MMOL/L (ref 5–15)
BASOPHILS # BLD: 0 K/UL (ref 0–0.1)
BASOPHILS NFR BLD: 0 % (ref 0–1)
BUN SERPL-MCNC: 14 MG/DL (ref 6–20)
BUN/CREAT SERPL: 15 (ref 12–20)
CALCIUM SERPL-MCNC: 8.8 MG/DL (ref 8.5–10.1)
CHLORIDE SERPL-SCNC: 106 MMOL/L (ref 97–108)
CO2 SERPL-SCNC: 21 MMOL/L (ref 21–32)
CREAT SERPL-MCNC: 0.93 MG/DL (ref 0.55–1.02)
DIFFERENTIAL METHOD BLD: ABNORMAL
EOSINOPHIL # BLD: 0 K/UL (ref 0–0.4)
EOSINOPHIL NFR BLD: 0 % (ref 0–7)
ERYTHROCYTE [DISTWIDTH] IN BLOOD BY AUTOMATED COUNT: 14.6 % (ref 11.5–14.5)
GLUCOSE SERPL-MCNC: 145 MG/DL (ref 65–100)
HCT VFR BLD AUTO: 40.3 % (ref 35–47)
HGB BLD-MCNC: 13 G/DL (ref 11.5–16)
IMM GRANULOCYTES # BLD AUTO: 0 K/UL
IMM GRANULOCYTES NFR BLD AUTO: 0 %
LYMPHOCYTES # BLD: 0.9 K/UL (ref 0.8–3.5)
LYMPHOCYTES NFR BLD: 5 % (ref 12–49)
MCH RBC QN AUTO: 28.4 PG (ref 26–34)
MCHC RBC AUTO-ENTMCNC: 32.3 G/DL (ref 30–36.5)
MCV RBC AUTO: 88.2 FL (ref 80–99)
METAMYELOCYTES NFR BLD MANUAL: 1 %
MONOCYTES # BLD: 0.5 K/UL (ref 0–1)
MONOCYTES NFR BLD: 3 % (ref 5–13)
MYELOCYTES NFR BLD MANUAL: 1 %
NEUTS BAND NFR BLD MANUAL: 1 % (ref 0–6)
NEUTS SEG # BLD: 15.4 K/UL (ref 1.8–8)
NEUTS SEG NFR BLD: 89 % (ref 32–75)
NRBC # BLD: 0 K/UL (ref 0–0.01)
NRBC BLD-RTO: 0 PER 100 WBC
PLATELET # BLD AUTO: 305 K/UL (ref 150–400)
PMV BLD AUTO: 9.1 FL (ref 8.9–12.9)
POTASSIUM SERPL-SCNC: 3.7 MMOL/L (ref 3.5–5.1)
RBC # BLD AUTO: 4.57 M/UL (ref 3.8–5.2)
RBC MORPH BLD: ABNORMAL
RBC MORPH BLD: ABNORMAL
SODIUM SERPL-SCNC: 137 MMOL/L (ref 136–145)
TROPONIN-HIGH SENSITIVITY: 10 NG/L (ref 0–51)
WBC # BLD AUTO: 17.1 K/UL (ref 3.6–11)

## 2022-01-19 PROCEDURE — 36415 COLL VENOUS BLD VENIPUNCTURE: CPT

## 2022-01-19 PROCEDURE — 74011000250 HC RX REV CODE- 250: Performed by: FAMILY MEDICINE

## 2022-01-19 PROCEDURE — 74011250637 HC RX REV CODE- 250/637: Performed by: FAMILY MEDICINE

## 2022-01-19 PROCEDURE — 74011250636 HC RX REV CODE- 250/636: Performed by: FAMILY MEDICINE

## 2022-01-19 PROCEDURE — 74011250637 HC RX REV CODE- 250/637: Performed by: HOSPITALIST

## 2022-01-19 PROCEDURE — 74011000250 HC RX REV CODE- 250: Performed by: INTERNAL MEDICINE

## 2022-01-19 PROCEDURE — 84484 ASSAY OF TROPONIN QUANT: CPT

## 2022-01-19 PROCEDURE — 65660000000 HC RM CCU STEPDOWN

## 2022-01-19 PROCEDURE — 80048 BASIC METABOLIC PNL TOTAL CA: CPT

## 2022-01-19 PROCEDURE — 74011250636 HC RX REV CODE- 250/636: Performed by: INTERNAL MEDICINE

## 2022-01-19 PROCEDURE — 82785 ASSAY OF IGE: CPT

## 2022-01-19 PROCEDURE — 74011000250 HC RX REV CODE- 250: Performed by: HOSPITALIST

## 2022-01-19 PROCEDURE — 74011250637 HC RX REV CODE- 250/637: Performed by: NURSE PRACTITIONER

## 2022-01-19 PROCEDURE — 94640 AIRWAY INHALATION TREATMENT: CPT

## 2022-01-19 PROCEDURE — 85025 COMPLETE CBC W/AUTO DIFF WBC: CPT

## 2022-01-19 RX ORDER — FAMOTIDINE 20 MG/1
10 TABLET, FILM COATED ORAL DAILY
Status: DISCONTINUED | OUTPATIENT
Start: 2022-01-19 | End: 2022-01-24 | Stop reason: HOSPADM

## 2022-01-19 RX ORDER — CETIRIZINE HYDROCHLORIDE, PSEUDOEPHEDRINE HYDROCHLORIDE 5; 120 MG/1; MG/1
1 TABLET, FILM COATED, EXTENDED RELEASE ORAL
Status: DISCONTINUED | OUTPATIENT
Start: 2022-01-19 | End: 2022-01-24 | Stop reason: HOSPADM

## 2022-01-19 RX ORDER — LEVOCETIRIZINE DIHYDROCHLORIDE 5 MG/1
5 TABLET, FILM COATED ORAL
Status: DISCONTINUED | OUTPATIENT
Start: 2022-01-19 | End: 2022-01-19

## 2022-01-19 RX ORDER — ARFORMOTEROL TARTRATE 15 UG/2ML
15 SOLUTION RESPIRATORY (INHALATION)
Status: DISCONTINUED | OUTPATIENT
Start: 2022-01-19 | End: 2022-01-24 | Stop reason: HOSPADM

## 2022-01-19 RX ORDER — BUDESONIDE 0.5 MG/2ML
500 INHALANT ORAL
Status: DISCONTINUED | OUTPATIENT
Start: 2022-01-19 | End: 2022-01-24 | Stop reason: HOSPADM

## 2022-01-19 RX ORDER — CALCIUM CARBONATE 200(500)MG
200 TABLET,CHEWABLE ORAL
Status: DISCONTINUED | OUTPATIENT
Start: 2022-01-19 | End: 2022-01-24 | Stop reason: HOSPADM

## 2022-01-19 RX ORDER — IPRATROPIUM BROMIDE 0.5 MG/2.5ML
0.5 SOLUTION RESPIRATORY (INHALATION)
Status: DISCONTINUED | OUTPATIENT
Start: 2022-01-19 | End: 2022-01-22

## 2022-01-19 RX ORDER — IPRATROPIUM BROMIDE AND ALBUTEROL SULFATE 2.5; .5 MG/3ML; MG/3ML
3 SOLUTION RESPIRATORY (INHALATION)
Status: DISCONTINUED | OUTPATIENT
Start: 2022-01-19 | End: 2022-01-19

## 2022-01-19 RX ORDER — CETIRIZINE HYDROCHLORIDE, PSEUDOEPHEDRINE HYDROCHLORIDE 5; 120 MG/1; MG/1
1 TABLET, FILM COATED, EXTENDED RELEASE ORAL
Status: DISCONTINUED | OUTPATIENT
Start: 2022-01-19 | End: 2022-01-19

## 2022-01-19 RX ADMIN — AZELASTINE HYDROCHLORIDE 1 SPRAY: 137 SPRAY, METERED NASAL at 18:06

## 2022-01-19 RX ADMIN — ARFORMOTEROL TARTRATE 15 MCG: 15 SOLUTION RESPIRATORY (INHALATION) at 19:47

## 2022-01-19 RX ADMIN — BUDESONIDE 500 MCG: 0.5 INHALANT RESPIRATORY (INHALATION) at 11:40

## 2022-01-19 RX ADMIN — CALCIUM CARBONATE (ANTACID) CHEW TAB 500 MG 200 MG: 500 CHEW TAB at 18:01

## 2022-01-19 RX ADMIN — SODIUM CHLORIDE 75 ML/HR: 9 INJECTION, SOLUTION INTRAVENOUS at 23:53

## 2022-01-19 RX ADMIN — PANTOPRAZOLE SODIUM 40 MG: 40 TABLET, DELAYED RELEASE ORAL at 09:02

## 2022-01-19 RX ADMIN — ARFORMOTEROL TARTRATE 15 MCG: 15 SOLUTION RESPIRATORY (INHALATION) at 11:40

## 2022-01-19 RX ADMIN — IPRATROPIUM BROMIDE 0.5 MG: 0.5 SOLUTION RESPIRATORY (INHALATION) at 09:53

## 2022-01-19 RX ADMIN — FAMOTIDINE 10 MG: 20 TABLET ORAL at 11:47

## 2022-01-19 RX ADMIN — METHYLPREDNISOLONE SODIUM SUCCINATE 80 MG: 125 INJECTION, POWDER, FOR SOLUTION INTRAMUSCULAR; INTRAVENOUS at 18:00

## 2022-01-19 RX ADMIN — SODIUM CHLORIDE 75 ML/HR: 9 INJECTION, SOLUTION INTRAVENOUS at 09:05

## 2022-01-19 RX ADMIN — CETIRIZINE HYDROCHLORIDE, PSEUDOEPHEDRINE HYDROCHLORIDE 1 TABLET: 5; 120 TABLET, FILM COATED, EXTENDED RELEASE ORAL at 12:22

## 2022-01-19 RX ADMIN — IPRATROPIUM BROMIDE 0.5 MG: 0.5 SOLUTION RESPIRATORY (INHALATION) at 19:47

## 2022-01-19 RX ADMIN — AZELASTINE HYDROCHLORIDE 1 SPRAY: 137 SPRAY, METERED NASAL at 08:59

## 2022-01-19 RX ADMIN — PANTOPRAZOLE SODIUM 40 MG: 40 TABLET, DELAYED RELEASE ORAL at 01:46

## 2022-01-19 RX ADMIN — BUDESONIDE 500 MCG: 0.5 INHALANT RESPIRATORY (INHALATION) at 19:47

## 2022-01-19 RX ADMIN — CALCIUM CARBONATE (ANTACID) CHEW TAB 500 MG 200 MG: 500 CHEW TAB at 11:47

## 2022-01-19 RX ADMIN — PANTOPRAZOLE SODIUM 40 MG: 40 TABLET, DELAYED RELEASE ORAL at 18:01

## 2022-01-19 RX ADMIN — SODIUM CHLORIDE, PRESERVATIVE FREE 5 ML: 5 INJECTION INTRAVENOUS at 23:56

## 2022-01-19 RX ADMIN — METHYLPREDNISOLONE SODIUM SUCCINATE 80 MG: 125 INJECTION, POWDER, FOR SOLUTION INTRAMUSCULAR; INTRAVENOUS at 11:48

## 2022-01-19 RX ADMIN — METHYLPREDNISOLONE SODIUM SUCCINATE 60 MG: 125 INJECTION, POWDER, FOR SOLUTION INTRAMUSCULAR; INTRAVENOUS at 09:02

## 2022-01-19 RX ADMIN — MONTELUKAST 10 MG: 10 TABLET, FILM COATED ORAL at 23:52

## 2022-01-19 RX ADMIN — IPRATROPIUM BROMIDE 0.5 MG: 0.5 SOLUTION RESPIRATORY (INHALATION) at 11:40

## 2022-01-19 RX ADMIN — SODIUM CHLORIDE, PRESERVATIVE FREE 10 ML: 5 INJECTION INTRAVENOUS at 01:46

## 2022-01-19 RX ADMIN — SODIUM CHLORIDE, PRESERVATIVE FREE 10 ML: 5 INJECTION INTRAVENOUS at 05:43

## 2022-01-19 RX ADMIN — AZITHROMYCIN DIHYDRATE 500 MG: 500 INJECTION, POWDER, LYOPHILIZED, FOR SOLUTION INTRAVENOUS at 17:59

## 2022-01-19 RX ADMIN — IPRATROPIUM BROMIDE 0.5 MG: 0.5 SOLUTION RESPIRATORY (INHALATION) at 16:12

## 2022-01-19 NOTE — PROGRESS NOTES
[address] [facility] Adult  Hospitalist Group                                                                                          Hospitalist Progress Note  Angelica Gustafson MD  Answering service: [phone] OR [phone] from in house phone        Date of Service:  2022  NAME:  Uzma Estrada  :  1985  MRN:  [id number]    This documentation was facilitated by a Voice Recognition software and may contain inadvertent typographical errors. 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 She never had childhood asthma. She has been suffering from frequent flareups requiring hospitalization, urgent care and PCP visit and receiving steroid use on top of her other respiratory therapeutics. She has been having respiratory difficulty and tachycardia since  with no relief despite high-dose steroid, bronchodilators inhaled steroids and allergy medications so she came to the ER on 2022. She is currently undergoing cardiac evaluation at [facility] referred by her pulmonologist for cardiac causes of her respiratory symptoms and tachycardia. Patient denied history of autoimmune disorders such as SLE, RA etc.    Interval history / Subjective:        Patient seen and examined the ED room 16. She is alert, speaks with a husky voice which she says happens with her respiratory flareup. She is mildly tachypneic. No audible wheezing. Tachycardic on the monitor in the 130s. Assessment & Plan:   Acute asthmatic exacerbation. She failed outpatient treatment. Has been having persistent symptoms since .  --CTA of the lung on admission negative for PE, showed bandlike foci of atelectasis/scarring in the lung bases and trace pericardial effusion.   Respiratory viral panel including SARS-CoV-2 PCR negative. Continue IV steroids, inhaled steroids/LABA, scheduled and as needed bronchodilators. On empiric antibiotics with Zithromax. -- Pulmonary input appreciated. Sinus tachycardia related to asthmatic exacerbation: Patient has had 10 tachycardia during flareups that slowly improve while in the acute attacks resolved. -- CTA lung showed trace pericardial effusion, will go ahead and obtain echocardiogram  -- Patient currently undergoing cardiac evaluation at [facility] to look into nonpulmonary causes for her respiratory symptoms    Leukocytosis, this is most probably due to steroid induced, patient not septic. Monitor  GERD: Patient uses both pantoprazole and famotidine. Care Plan discussed with: Patient  Code status: Full code  DVT prophylaxis: Lovenox  Anticipated Disposition: Home in 1-2 days          Hospital Problems  Date Reviewed: 12/8/2021          Codes Class Noted POA    SOB (shortness of breath) ICD-10-CM: R06.02  ICD-9-CM: 786.05  1/18/2022 Unknown                Review of Systems:   A comprehensive review of systems was negative except for that written in the HPI. Vital Signs:    Last 24hrs VS reviewed since prior progress note. Most recent are:        Intake/Output Summary (Last 24 hours) at 1/19/2022 1623  Last data filed at 1/19/2022 0700  Gross per 24 hour   Intake 2132.5 ml   Output    Net 2132.5 ml        Physical Examination:     I had a face to face encounter with this patient and independently examined them on1/19/2022 as outlined below:        Constitutional:  Alert     HEENT:  Atraumatic. Oral mucosa moist,. Non icteric sclera. No pallor. Resp:  On room air, mildly tachypneic. Surprisingly she has no trace of wheezing during my exam.   Chest Wall: No deformity     CV:  Regular rhythm, normal rate, no murmurs, gallops, rubs      GI:  Normoactive  Soft, non distended, non tender.    No appreciable organomegaly      :  No CVA or suprapubic tenderness      Musculoskeletal:  No edema  No deformity  ROM intact      Neurologic:  Mental status:AAOx3,   Cranial nerves II-XII : WNL  Motor exam:Moves all extremities symmetrically                Data Review:    Review and/or order of clinical lab test  Review and/or order of tests in the radiology section of CPT  Review and/or order of tests in the medicine section of CPT      Available Lab and Imaging results reviewed and used to formulate the current care plan.       Medications Reviewed:     Current Facility-Administered Medications   Medication Dose Route Frequency    ipratropium (ATROVENT) 0.02 % nebulizer solution 0.5 mg  0.5 mg Nebulization Q4H RT    methylPREDNISolone (PF) (Solu-MEDROL) injection 80 mg  80 mg IntraVENous Q6H    calcium carbonate (TUMS) chewable tablet 200 mg [elemental]  200 mg Oral TID WITH MEALS    famotidine (PEPCID) tablet 10 mg  10 mg Oral DAILY    arformoteroL (BROVANA) neb solution 15 mcg  15 mcg Nebulization BID RT    And    budesonide (PULMICORT) 500 mcg/2 ml nebulizer suspension  500 mcg Nebulization BID RT    cetirizine-pseudoePHEDrine (ZyrTEC-D) 5-120 mg per tablet 1 Tablet (Patient Supplied)  1 Tablet Oral BID PRN    sodium chloride (NS) flush 5-40 mL  5-40 mL IntraVENous Q8H    sodium chloride (NS) flush 5-40 mL  5-40 mL IntraVENous PRN    0.9% sodium chloride infusion  75 mL/hr IntraVENous CONTINUOUS    azithromycin (ZITHROMAX) 500 mg in 0.9% sodium chloride 250 mL (VIAL-MATE)  500 mg IntraVENous Q24H    acetaminophen (TYLENOL) tablet 650 mg  650 mg Oral Q4H PRN    ondansetron (ZOFRAN) injection 4 mg  4 mg IntraVENous Q4H PRN    montelukast (SINGULAIR) tablet 10 mg  10 mg Oral QHS    albuterol-ipratropium (DUO-NEB) 2.5 MG-0.5 MG/3 ML  3 mL Nebulization Q6H PRN    hydrOXYzine HCL (ATARAX) tablet 50 mg  50 mg Oral TID PRN    fluticasone propionate (FLONASE) 50 mcg/actuation nasal spray 2 Spray  2 Spray Both Nostrils DAILY    azelastine (ASTELIN) 137mcg/spray nasal spray  1 Spray Both Nostrils BID    pantoprazole (PROTONIX) tablet 40 mg  40 mg Oral BID     Current Outpatient Medications   Medication Sig    hydrOXYzine HCL (ATARAX) 25 mg tablet Take 50 mg by mouth three (3) times daily as needed.  doxylamine succinate (UNISOM) 25 mg tablet Take 25 mg by mouth nightly as needed.  levocetirizine (Xyzal) 5 mg tablet Take 5 mg by mouth daily as needed for Allergies.  albuterol-ipratropium (DUO-NEB) 2.5 mg-0.5 mg/3 ml nebu 3 mL by Nebulization route every six (6) hours as needed for Wheezing or Shortness of Breath. Indications: bronchi muscle spasm resulting from COPD    predniSONE (DELTASONE) 10 mg tablet Take 40mg (4 tabs) daily for 3 days, then 20mg (2 tabs) daily for 3 days, then 10mg (1 tab) daily for 3 days    cetirizine-pseudoePHEDrine (ZyrTEC-D) 5-120 mg Tb12 Take 1 Tablet by mouth once over twenty-four (24) hours.  azelastine (ASTEPRO) 205.5 mcg (0.15 %) 2 Sprays two (2) times a day.  zafirlukast (ACCOLATE) 20 mg tablet Take 20 mg by mouth two (2) times a day.  pantoprazole (PROTONIX) 40 mg tablet Take 40 mg by mouth daily.  famotidine (PEPCID) 10 mg tablet Take 10 mg by mouth daily.  norgestimate-ethinyl estradiol (ORTHO-CYCLEN, SPRINTEC) 0.25-35 mg-mcg tab Take 1 Tab by mouth daily.  triamcinolone (NASACORT) 55 mcg nasal inhaler 2 Sprays daily.  budesonide-formoteroL (Symbicort) 160-4.5 mcg/actuation HFAA Take 2 Puffs by inhalation two (2) times a day. (Patient not taking: Reported on 1/18/2022)    guaiFENesin-dextromethorphan SR (MUCINEX DM) 600-30 mg per tablet Take 1 Tab by mouth two (2) times a day. (Patient not taking: Reported on 1/18/2022)    albuterol sulfate 90 mcg/actuation aepb Take 2 Puffs by inhalation every four (4) hours as needed for Cough.  (Patient not taking: Reported on 1/18/2022)     ______________________________________________________________________  EXPECTED LENGTH OF STAY: - - -  ACTUAL LENGTH OF STAY:          1                 Mario Koenig MD

## 2022-01-19 NOTE — ACP (ADVANCE CARE PLANNING)
12:05 PM  Advance Care Planning     Advance Care Planning Clinical Specialist  Conversation Note      Date of ACP Conversation: 1/18/2022    Conversation Conducted with:   Patient with Decision Making Capacity    ACP Clinical Specialist: Carlito Membreno Decision Maker:    Current Designated Health Care Decision Maker:     \"Who would you like to name as your primary health care decision-maker? \"    Name: Carly Clemens  Relationship: Spouse   Phone number: [phone]  \"Can this person be reached easily? YES    Care Preferences    Hospitalization: \"If your health worsens and it becomes clear that your chance of recovery is unlikely, what would your preference be regarding hospitalization? \"    Choice:  [x]  The patient wants hospitalization  []  The patient prefers comfort-focused treatment without hospitalization. Ventilation: \"If you were in your present state of health and suddenly became very ill and were unable to breathe on your own, what would your preference be about the use of a ventilator (breathing machine) if it were available to you? \"      If patient would desire the use of a ventilator (breathing machine), answer \"yes\", if not \"no\": YES    \"If your health worsens and it becomes clear that your chance of recovery is unlikely, what would your preference be about the use of a ventilator (breathing machine) if it were available to you? \"     If patient would desire the use of a ventilator (breathing machine), answer \"yes\", if not \"no\".: YES      Resuscitation  \"CPR works best to restart the heart when there is a sudden event, like a heart attack, in someone who is otherwise healthy. Unfortunately, CPR does not typically restart the heart for people who have serious health conditions or who are very sick. \"    \"In the event your heart stopped as a result of an underlying serious health condition, would you want attempts to be made to restart your heart (answer \"yes\" for attempt to resuscitate) or would you prefer a natural death (answer \"no\" for do not attempt to resuscitate)? \" YES      [x] Yes  [] No   Educated Patient / Jami Travisval regarding differences between Advance Directives and portable DNR orders.     Length of ACP Conversation in minutes:      Conversation Outcomes:  [x] ACP discussion completed  [] Existing advance directive reviewed with patient; no changes to patient's previously recorded wishes   [] New Advance Directive completed   [] Portable Do Not Rescitate prepared for Provider review and signature  [] POLST/POST/MOLST/MOST prepared for Provider review and signature      Follow-up plan:    [x] Schedule follow-up conversation to continue planning  [] Referred individual to Provider for additional questions/concerns   [] Advised patient/agent/surrogate to review completed ACP document and update if needed with changes in condition, patient preferences or care setting     [x] This note routed to one or more involved healthcare providers    BEREKET Alston/LAVON  Care Management

## 2022-01-19 NOTE — PROGRESS NOTES
01/19/22 1200   Vitals   Temp 98.4 °F (36.9 °C)   Temp Source Oral   Pulse (Heart Rate) (!) 111   Resp Rate 15   O2 Sat (%) 96 %   Level of Consciousness Alert (0)   BP (!) 126/92   MAP (Calculated) 103   MEWS Score 3   patient post albuterol treatment .

## 2022-01-19 NOTE — PROGRESS NOTES
Transition of Care Plan  1. RUR- 9%   2. DISPOSITION: TBD/subject to change pending recommendations; pending medical progression   -Anticipate home with family assistance once medically stable. 3. F/U with PCP/Specialist    4. Transport: Robyn Clinton [phone]    Reason for Admission:  SOB (shortness of breath)                     RUR Score:    9%                 Plan for utilizing home health:   Not at this time. PCP: YES First and Last name:  Yvonne Rocha NP     Name of Practice: [facility]   Are you a current patient: Yes/No: YES   Approximate date of last visit: 4 weeks ago per patient   Can you participate in a virtual visit with your PCP: YES                    Current Advanced Directive/Advance Care Plan: Full Code      Healthcare Decision Maker:   Radha Clinton 25.           39year old female, AOx3. Independent with ADL's and IADL's. No DME utilized. Resides with spouse in their own home. Patient is a  with [facility] with no significant financial stressors or concerns. Insurance verified: [employer]PAD. [facility]. Care Management Interventions  PCP Verified by CM: Yes  Palliative Care Criteria Met (RRAT>21 & CHF Dx)?: No  Mode of Transport at Discharge:  Other (see comment) (Family)  Transition of Care Consult (CM Consult):  (No current CM consult or needs at this time.)  Discharge Durable Medical Equipment: No  Physical Therapy Consult: No  Occupational Therapy Consult: No  Speech Therapy Consult: No  Support Systems: Spouse/Significant Other  Confirm Follow Up Transport: Family  Discharge Location  Patient Expects to be Discharged to[de-identified] Home with family assistance (TBD/subject to change pending recommendations)    BEREKET Zambrano/LAVON  Care Management  11:38 AM

## 2022-01-19 NOTE — ED NOTES
Verbal shift change report given to Corbin Monroe RN (oncoming nurse) by Tammie Mckenzie RN (offgoing nurse). Report included the following information SBAR, Kardex and ED Summary.

## 2022-01-19 NOTE — PROGRESS NOTES
Bedside and Verbal shift change report given to *** (oncoming nurse) by Leah Chiang (offgoing nurse). Report included the following information SBAR, Kardex, Intake/Output, MAR and Recent Results.

## 2022-01-19 NOTE — CONSULTS
Pulmonary, Critical Care, and Sleep Medicine    Initial Patient -Telemed-Consult    Name: Shonda Justice MRN: [id number]   : 1985 [facility]   Date: 2022        IMPRESSION:   · Asthma exacerbation- recurrent exacerbator phenotype (1-2 /year) with oral corticosteroid dependence . Suspect T2 asthma. Presently with acute exacerbation possible viral triggered ( COVID negative) . . · Multiple allergies- sees Dr Keila Araya- allergy  · GERD- PPI      RECOMMENDATIONS:   · Continue IV steroids  · DDX- for severe steroid dependent asthma- ? ABPA. · Concerns over tachycardia- suggest xopenex nebs scheduled  · Scheduled pulmicort BID  · Pt follows with Dr. Beatriz Negron. · Got first shot of TSLP inhibitor ( last week from allergist)       Subjective:     40 y/o asthma on trelegy admitted with SOB. Pt with AE asthma. Has been in ED 24 hours. Dry cough no fevers. Multiple negative COVID tests. + vaccinated. Was being treated for AE asthma last 6 week pred tapers up to 60 mg a day. Past Medical History:   Diagnosis Date    Asthma     GERD (gastroesophageal reflux disease)       No past surgical history on file. Prior to Admission medications    Medication Sig Start Date End Date Taking? Authorizing Provider   hydrOXYzine HCL (ATARAX) 25 mg tablet Take 50 mg by mouth three (3) times daily as needed. Yes Other, MD Aimee   doxylamine succinate (UNISOM) 25 mg tablet Take 25 mg by mouth nightly as needed. Yes Other, MD Aimee   levocetirizine (Xyzal) 5 mg tablet Take 5 mg by mouth daily as needed for Allergies. Yes Other, MD Aimee   albuterol-ipratropium (DUO-NEB) 2.5 mg-0.5 mg/3 ml nebu 3 mL by Nebulization route every six (6) hours as needed for Wheezing or Shortness of Breath.  Indications: bronchi muscle spasm resulting from COPD 21  Yes Nitin Gutierrez MD   predniSONE (DELTASONE) 10 mg tablet Take 40mg (4 tabs) daily for 3 days, then 20mg (2 tabs) daily for 3 days, then 10mg (1 tab) daily for 3 days 12/8/21  Yes Do, Nitin TOLENTINO MD   cetirizine-pseudoePHEDrine (ZyrTEC-D) 5-120 mg Tb12 Take 1 Tablet by mouth once over twenty-four (24) hours. Yes Provider, Historical   azelastine (ASTEPRO) 205.5 mcg (0.15 %) 2 Sprays two (2) times a day. Yes Provider, Historical   zafirlukast (ACCOLATE) 20 mg tablet Take 20 mg by mouth two (2) times a day. Yes Provider, Historical   pantoprazole (PROTONIX) 40 mg tablet Take 40 mg by mouth daily. Yes Provider, Historical   famotidine (PEPCID) 10 mg tablet Take 10 mg by mouth daily. Yes Provider, Historical   norgestimate-ethinyl estradiol (ORTHO-CYCLEN, [facility]) 0.25-35 mg-mcg tab Take 1 Tab by mouth daily. Yes Provider, Historical   triamcinolone (NASACORT) 55 mcg nasal inhaler 2 Sprays daily. Yes Provider, Historical   budesonide-formoteroL (Symbicort) 160-4.5 mcg/actuation HFAA Take 2 Puffs by inhalation two (2) times a day. Patient not taking: Reported on 1/18/2022    Provider, Historical   guaiFENesin-dextromethorphan SR (MUCINEX DM) 600-30 mg per tablet Take 1 Tab by mouth two (2) times a day. Patient not taking: Reported on 1/18/2022    Provider, Historical   albuterol sulfate 90 mcg/actuation aepb Take 2 Puffs by inhalation every four (4) hours as needed for Cough. Patient not taking: Reported on 1/18/2022 10/17/19   Galo Jordan MD     Allergies   Allergen Reactions    Latex Other (comments)     rash    Galactose-Alpha-1,3-Galactose (Alpha-Gal) Anaphylaxis     Hives, gi upset    Phenazopyridine Other (comments)     Low bp, tacycardic, asthmatic flare    Sulfamethoxazole-Trimethoprim Other (comments)     Low bp, thacycardic, asthmatic flare      Social History     Tobacco Use    Smoking status: Never Smoker    Smokeless tobacco: Never Used   Substance Use Topics    Alcohol use: Not on file      No family history on file.      Current Facility-Administered Medications   Medication Dose Route Frequency    albuterol-ipratropium (DUO-NEB) 2.5 MG-0.5 MG/3 ML  3 mL Nebulization Q6H RT    sodium chloride (NS) flush 5-40 mL  5-40 mL IntraVENous Q8H    0.9% sodium chloride infusion  75 mL/hr IntraVENous CONTINUOUS    methylPREDNISolone (PF) (Solu-MEDROL) injection 60 mg  60 mg IntraVENous Q12H    azithromycin (ZITHROMAX) 500 mg in 0.9% sodium chloride 250 mL (VIAL-MATE)  500 mg IntraVENous Q24H    montelukast (SINGULAIR) tablet 10 mg  10 mg Oral QHS    fluticasone propionate (FLONASE) 50 mcg/actuation nasal spray 2 Spray  2 Spray Both Nostrils DAILY    azelastine (ASTELIN) 137mcg/spray nasal spray  1 Spray Both Nostrils BID    pantoprazole (PROTONIX) tablet 40 mg  40 mg Oral BID       Review of Systems:  Review of systems not obtained due to patient factors. Objective:   Vital Signs:    Visit Vitals  BP (!) 138/97   Pulse 96   Temp 97.7 °F (36.5 °C)   Resp 19   SpO2 96%       O2 Device: None (Room air)       Temp (24hrs), Av.9 °F (36.6 °C), Min:97.7 °F (36.5 °C), Max:98.4 °F (36.9 °C)       Intake/Output:   Last shift:      No intake/output data recorded.   Last 3 shifts:  190 -  0700  In: 2132.5 [I.V.:2132.5]  Out: -     Intake/Output Summary (Last 24 hours) at 2022 0918  Last data filed at 2022 0700  Gross per 24 hour   Intake 2132.5 ml   Output    Net 2132.5 ml      Physical Exam:   No wob, no oral thrush, no accessory use, no abd paradox, trachea midline  Data review:     Recent Results (from the past 24 hour(s))   EKG, 12 LEAD, INITIAL    Collection Time: 22 10:11 AM   Result Value Ref Range    Ventricular Rate 135 BPM    Atrial Rate 135 BPM    P-R Interval 128 ms    QRS Duration 72 ms    Q-T Interval 274 ms    QTC Calculation (Bezet) 411 ms    Calculated P Axis 56 degrees    Calculated R Axis 73 degrees    Calculated T Axis 43 degrees    Diagnosis       Sinus tachycardia    When compared with ECG of 06-DEC-2021 12:10,  No significant change was found  Confirmed by Nirmala Walton M.D., [facility] Vincent ([id number]) on 1/18/2022 7:47:21 PM     CBC WITH AUTOMATED DIFF    Collection Time: 01/18/22 10:19 AM   Result Value Ref Range    WBC 14.3 (H) 3.6 - 11.0 K/uL    RBC 4.96 3.80 - 5.20 M/uL    HGB 14.2 11.5 - 16.0 g/dL    HCT 43.0 35.0 - 47.0 %    MCV 86.7 80.0 - 99.0 FL    MCH 28.6 26.0 - 34.0 PG    MCHC 33.0 30.0 - 36.5 g/dL    RDW 14.6 (H) 11.5 - 14.5 %    PLATELET 167 287 - 368 K/uL    MPV 9.0 8.9 - 12.9 FL    NRBC 0.0 0  WBC    ABSOLUTE NRBC 0.00 0.00 - 0.01 K/uL    NEUTROPHILS 72 32 - 75 %    BAND NEUTROPHILS 2 0 - 6 %    LYMPHOCYTES 13 12 - 49 %    MONOCYTES 9 5 - 13 %    EOSINOPHILS 0 0 - 7 %    BASOPHILS 0 0 - 1 %    METAMYELOCYTES 3 (H) 0 %    MYELOCYTES 1 (H) 0 %    IMMATURE GRANULOCYTES 0 %    ABS. NEUTROPHILS 10.6 (H) 1.8 - 8.0 K/UL    ABS. LYMPHOCYTES 1.9 0.8 - 3.5 K/UL    ABS. MONOCYTES 1.3 (H) 0.0 - 1.0 K/UL    ABS. EOSINOPHILS 0.0 0.0 - 0.4 K/UL    ABS. BASOPHILS 0.0 0.0 - 0.1 K/UL    ABS. IMM. GRANS. 0.0 K/UL    DF MANUAL      RBC COMMENTS ANISOCYTOSIS  1+        WBC COMMENTS ATYPICAL LYMPHOCYTES PRESENT     METABOLIC PANEL, COMPREHENSIVE    Collection Time: 01/18/22 10:19 AM   Result Value Ref Range    Sodium 139 136 - 145 mmol/L    Potassium 4.0 3.5 - 5.1 mmol/L    Chloride 106 97 - 108 mmol/L    CO2 29 21 - 32 mmol/L    Anion gap 4 (L) 5 - 15 mmol/L    Glucose 85 65 - 100 mg/dL    BUN 20 6 - 20 MG/DL    Creatinine 0.82 0.55 - 1.02 MG/DL    BUN/Creatinine ratio 24 (H) 12 - 20      GFR est AA >60 >60 ml/min/1.73m2    GFR est non-AA >60 >60 ml/min/1.73m2    Calcium 9.0 8.5 - 10.1 MG/DL    Bilirubin, total 0.3 0.2 - 1.0 MG/DL    ALT (SGPT) 28 12 - 78 U/L    AST (SGOT) 12 (L) 15 - 37 U/L    Alk.  phosphatase 29 (L) 45 - 117 U/L    Protein, total 6.6 6.4 - 8.2 g/dL    Albumin 3.6 3.5 - 5.0 g/dL    Globulin 3.0 2.0 - 4.0 g/dL    A-G Ratio 1.2 1.1 - 2.2     SAMPLES BEING HELD    Collection Time: 01/18/22 10:19 AM   Result Value Ref Range    SAMPLES BEING HELD 1RED     COMMENT        Add-on orders for these samples will be processed based on acceptable specimen integrity and analyte stability, which may vary by analyte.    TROPONIN-HIGH SENSITIVITY    Collection Time: 01/18/22 10:19 AM   Result Value Ref Range    Troponin-High Sensitivity 12 0 - 51 ng/L   D DIMER    Collection Time: 01/18/22 10:19 AM   Result Value Ref Range    D-dimer 0.74 (H) 0.00 - 0.65 mg/L FEU   TSH 3RD GENERATION    Collection Time: 01/18/22 10:19 AM   Result Value Ref Range    TSH 0.95 0.36 - 3.74 uIU/mL   T4, FREE    Collection Time: 01/18/22 10:19 AM   Result Value Ref Range    T4, Free 1.1 0.8 - 1.5 NG/DL   COVID-19 RAPID TEST    Collection Time: 01/18/22 11:16 AM   Result Value Ref Range    Specimen source Nasopharyngeal      COVID-19 rapid test Not detected NOTD     HCG URINE, QL. - POC    Collection Time: 01/18/22 12:49 PM   Result Value Ref Range    Pregnancy test,urine (POC) Negative NEG     EKG, 12 LEAD, SUBSEQUENT    Collection Time: 01/18/22  2:00 PM   Result Value Ref Range    Ventricular Rate 114 BPM    Atrial Rate 114 BPM    P-R Interval 128 ms    QRS Duration 74 ms    Q-T Interval 294 ms    QTC Calculation (Bezet) 405 ms    Calculated P Axis 47 degrees    Calculated R Axis 28 degrees    Calculated T Axis 31 degrees    Diagnosis       Sinus tachycardia    When compared with ECG of 18-JAN-2022 10:11,  heart rate has decreased  Confirmed by Abbi Franklin M.D., [facility] ([id number]) on 1/18/2022 8:15:12 PM     POC G3 - PUL    Collection Time: 01/18/22  8:24 PM   Result Value Ref Range    pH (POC) 7.39 7.35 - 7.45      pCO2 (POC) 33.2 (L) 35.0 - 45.0 MMHG    pO2 (POC) 87 80 - 100 MMHG    HCO3 (POC) 20.2 (L) 22 - 26 MMOL/L    sO2 (POC) 96.6 92 - 97 %    Base deficit (POC) 3.9 mmol/L    Site LEFT RADIAL      Device: ROOM AIR      Allens test (POC) Positive      Specimen type (POC) ARTERIAL     TROPONIN-HIGH SENSITIVITY    Collection Time: 01/18/22  8:47 PM   Result Value Ref Range    Troponin-High Sensitivity 11 0 - 51 ng/L   RESPIRATORY VIRUS PANEL W/COVID-19, PCR    Collection Time: 01/18/22  8:47 PM    Specimen: Nasopharyngeal   Result Value Ref Range    Adenovirus Not detected NOTD      Coronavirus 229E Not detected NOTD      Coronavirus HKU1 Not detected NOTD      Coronavirus CVNL63 Not detected NOTD      Coronavirus OC43 Not detected NOTD      SARS-CoV-2, PCR Not detected NOTD      Metapneumovirus Not detected NOTD      Rhinovirus and Enterovirus Not detected NOTD      Influenza A Not detected NOTD      Influenza A, subtype H1 Not detected NOTD      Influenza A, subtype H3 Not detected NOTD      INFLUENZA A H1N1 PCR Not detected NOTD      Influenza B Not detected NOTD      Parainfluenza 1 Not detected NOTD      Parainfluenza 2 Not detected NOTD      Parainfluenza 3 Not detected NOTD      Parainfluenza virus 4 Not detected NOTD      RSV by PCR Not detected NOTD      B. parapertussis, PCR Not detected NOTD      Bordetella pertussis - PCR Not detected NOTD      Chlamydophila pneumoniae DNA, QL, PCR Not detected NOTD      Mycoplasma pneumoniae DNA, QL, PCR Not detected NOTD     METABOLIC PANEL, BASIC    Collection Time: 01/19/22  2:50 AM   Result Value Ref Range    Sodium 137 136 - 145 mmol/L    Potassium 3.7 3.5 - 5.1 mmol/L    Chloride 106 97 - 108 mmol/L    CO2 21 21 - 32 mmol/L    Anion gap 10 5 - 15 mmol/L    Glucose 145 (H) 65 - 100 mg/dL    BUN 14 6 - 20 MG/DL    Creatinine 0.93 0.55 - 1.02 MG/DL    BUN/Creatinine ratio 15 12 - 20      GFR est AA >60 >60 ml/min/1.73m2    GFR est non-AA >60 >60 ml/min/1.73m2    Calcium 8.8 8.5 - 10.1 MG/DL   CBC WITH AUTOMATED DIFF    Collection Time: 01/19/22  2:50 AM   Result Value Ref Range    WBC 17.1 (H) 3.6 - 11.0 K/uL    RBC 4.57 3.80 - 5.20 M/uL    HGB 13.0 11.5 - 16.0 g/dL    HCT 40.3 35.0 - 47.0 %    MCV 88.2 80.0 - 99.0 FL    MCH 28.4 26.0 - 34.0 PG    MCHC 32.3 30.0 - 36.5 g/dL    RDW 14.6 (H) 11.5 - 14.5 %    PLATELET 331 000 - 927 K/uL    MPV 9.1 8.9 - 12.9 FL    NRBC 0.0 0  WBC    ABSOLUTE NRBC 0.00 0.00 - 0.01 K/uL    NEUTROPHILS 89 (H) 32 - 75 %    BAND NEUTROPHILS 1 0 - 6 %    LYMPHOCYTES 5 (L) 12 - 49 %    MONOCYTES 3 (L) 5 - 13 %    EOSINOPHILS 0 0 - 7 %    BASOPHILS 0 0 - 1 %    METAMYELOCYTES 1 (H) 0 %    MYELOCYTES 1 (H) 0 %    IMMATURE GRANULOCYTES 0 %    ABS. NEUTROPHILS 15.4 (H) 1.8 - 8.0 K/UL    ABS. LYMPHOCYTES 0.9 0.8 - 3.5 K/UL    ABS. MONOCYTES 0.5 0.0 - 1.0 K/UL    ABS. EOSINOPHILS 0.0 0.0 - 0.4 K/UL    ABS. BASOPHILS 0.0 0.0 - 0.1 K/UL    ABS. IMM.  GRANS. 0.0 K/UL    DF MANUAL      RBC COMMENTS ANISOCYTOSIS  1+        RBC COMMENTS SMUDGE CELLS SEEN     TROPONIN-HIGH SENSITIVITY    Collection Time: 01/19/22  2:50 AM   Result Value Ref Range    Troponin-High Sensitivity 10 0 - 51 ng/L       Imaging:  I have personally reviewed the patients radiographs and have reviewed the reports:  CTA no PE no ILD no GGO trachea patent        Jennie Eddy MD

## 2022-01-19 NOTE — PROGRESS NOTES
1830- Patient's -130s. This RN assessed patient. Patient was eating and reports that this is what happens whenever she is eating or exerts herself. Patient denies any pain, SOB or dyspnea. 98% RA and respirations 20. MD notified. Patient encouraged to use [facility].   1945- Bedside shift change report given to Stephen Cooper (oncoming nurse) by Virginia Delgado (offgoing nurse). Report included the following information SBAR, Kardex, Procedure Summary, MAR, Recent Results and Cardiac Rhythm NSR/ST.

## 2022-01-20 ENCOUNTER — APPOINTMENT (OUTPATIENT)
Dept: NON INVASIVE DIAGNOSTICS | Age: 37
DRG: 202 | End: 2022-01-20
Attending: FAMILY MEDICINE
Payer: COMMERCIAL

## 2022-01-20 ENCOUNTER — APPOINTMENT (OUTPATIENT)
Dept: GENERAL RADIOLOGY | Age: 37
DRG: 202 | End: 2022-01-20
Attending: PHYSICIAN ASSISTANT
Payer: COMMERCIAL

## 2022-01-20 LAB
ECHO AO ROOT DIAM: 2.7 CM
ECHO AV AREA PEAK VELOCITY: 2.4 CM2
ECHO AV AREA PEAK VELOCITY: 2.4 CM2
ECHO AV PEAK GRADIENT: 10 MMHG
ECHO AV PEAK VELOCITY: 1.6 M/S
ECHO AV VELOCITY RATIO: 0.75
ECHO EST RA PRESSURE: 3 MMHG
ECHO LA DIAMETER: 2.7 CM
ECHO LA TO AORTIC ROOT RATIO: 1
ECHO LA VOL 2C: 24 ML (ref 22–52)
ECHO LA VOL 4C: 26 ML (ref 22–52)
ECHO LA VOLUME AREA LENGTH: 27 ML
ECHO LV E' LATERAL VELOCITY: 11 CM/S
ECHO LV E' SEPTAL VELOCITY: 12 CM/S
ECHO LV FRACTIONAL SHORTENING: 21 % (ref 28–44)
ECHO LV INTERNAL DIMENSION DIASTOLIC: 3.4 CM (ref 3.9–5.3)
ECHO LV INTERNAL DIMENSION SYSTOLIC: 2.7 CM
ECHO LV IVSD: 0.8 CM (ref 0.6–0.9)
ECHO LV MASS 2D: 84.9 G (ref 67–162)
ECHO LV POSTERIOR WALL DIASTOLIC: 1 CM (ref 0.6–0.9)
ECHO LV RELATIVE WALL THICKNESS RATIO: 0.59
ECHO LVOT AREA: 3.1 CM2
ECHO LVOT DIAM: 2 CM
ECHO LVOT PEAK GRADIENT: 6 MMHG
ECHO LVOT PEAK VELOCITY: 1.2 M/S
ECHO PV MAX VELOCITY: 1.1 M/S
ECHO PV PEAK GRADIENT: 5 MMHG
ECHO RV INTERNAL DIMENSION: 3.4 CM
ECHO RV TAPSE: 1.7 CM (ref 1.5–2)
RHEUMATOID FACT SERPL-ACNC: <10 IU/ML

## 2022-01-20 PROCEDURE — 36415 COLL VENOUS BLD VENIPUNCTURE: CPT

## 2022-01-20 PROCEDURE — 71045 X-RAY EXAM CHEST 1 VIEW: CPT

## 2022-01-20 PROCEDURE — 86431 RHEUMATOID FACTOR QUANT: CPT

## 2022-01-20 PROCEDURE — 74011250637 HC RX REV CODE- 250/637: Performed by: FAMILY MEDICINE

## 2022-01-20 PROCEDURE — 74011000250 HC RX REV CODE- 250: Performed by: HOSPITALIST

## 2022-01-20 PROCEDURE — 74011250637 HC RX REV CODE- 250/637: Performed by: HOSPITALIST

## 2022-01-20 PROCEDURE — 94640 AIRWAY INHALATION TREATMENT: CPT

## 2022-01-20 PROCEDURE — 74011000250 HC RX REV CODE- 250: Performed by: FAMILY MEDICINE

## 2022-01-20 PROCEDURE — 86038 ANTINUCLEAR ANTIBODIES: CPT

## 2022-01-20 PROCEDURE — 74011000250 HC RX REV CODE- 250: Performed by: INTERNAL MEDICINE

## 2022-01-20 PROCEDURE — 77010033678 HC OXYGEN DAILY

## 2022-01-20 PROCEDURE — 93306 TTE W/DOPPLER COMPLETE: CPT | Performed by: INTERNAL MEDICINE

## 2022-01-20 PROCEDURE — 74011250636 HC RX REV CODE- 250/636: Performed by: INTERNAL MEDICINE

## 2022-01-20 PROCEDURE — 65660000000 HC RM CCU STEPDOWN

## 2022-01-20 PROCEDURE — 74011250637 HC RX REV CODE- 250/637: Performed by: NURSE PRACTITIONER

## 2022-01-20 PROCEDURE — 74011250636 HC RX REV CODE- 250/636: Performed by: FAMILY MEDICINE

## 2022-01-20 PROCEDURE — 93306 TTE W/DOPPLER COMPLETE: CPT

## 2022-01-20 RX ORDER — LANOLIN ALCOHOL/MO/W.PET/CERES
3 CREAM (GRAM) TOPICAL
Status: DISCONTINUED | OUTPATIENT
Start: 2022-01-20 | End: 2022-01-24 | Stop reason: HOSPADM

## 2022-01-20 RX ADMIN — CALCIUM CARBONATE (ANTACID) CHEW TAB 500 MG 200 MG: 500 CHEW TAB at 18:28

## 2022-01-20 RX ADMIN — Medication 3 MG: at 22:28

## 2022-01-20 RX ADMIN — BUDESONIDE 500 MCG: 0.5 INHALANT RESPIRATORY (INHALATION) at 07:11

## 2022-01-20 RX ADMIN — METHYLPREDNISOLONE SODIUM SUCCINATE 80 MG: 125 INJECTION, POWDER, FOR SOLUTION INTRAMUSCULAR; INTRAVENOUS at 05:25

## 2022-01-20 RX ADMIN — BUDESONIDE 500 MCG: 0.5 INHALANT RESPIRATORY (INHALATION) at 20:18

## 2022-01-20 RX ADMIN — IPRATROPIUM BROMIDE 0.5 MG: 0.5 SOLUTION RESPIRATORY (INHALATION) at 07:11

## 2022-01-20 RX ADMIN — IPRATROPIUM BROMIDE 0.5 MG: 0.5 SOLUTION RESPIRATORY (INHALATION) at 03:42

## 2022-01-20 RX ADMIN — CALCIUM CARBONATE (ANTACID) CHEW TAB 500 MG 200 MG: 500 CHEW TAB at 09:11

## 2022-01-20 RX ADMIN — ARFORMOTEROL TARTRATE 15 MCG: 15 SOLUTION RESPIRATORY (INHALATION) at 07:11

## 2022-01-20 RX ADMIN — IPRATROPIUM BROMIDE 0.5 MG: 0.5 SOLUTION RESPIRATORY (INHALATION) at 00:39

## 2022-01-20 RX ADMIN — MONTELUKAST 10 MG: 10 TABLET, FILM COATED ORAL at 22:28

## 2022-01-20 RX ADMIN — METHYLPREDNISOLONE SODIUM SUCCINATE 80 MG: 125 INJECTION, POWDER, FOR SOLUTION INTRAMUSCULAR; INTRAVENOUS at 18:28

## 2022-01-20 RX ADMIN — PANTOPRAZOLE SODIUM 40 MG: 40 TABLET, DELAYED RELEASE ORAL at 09:11

## 2022-01-20 RX ADMIN — ARFORMOTEROL TARTRATE 15 MCG: 15 SOLUTION RESPIRATORY (INHALATION) at 20:18

## 2022-01-20 RX ADMIN — CETIRIZINE HYDROCHLORIDE, PSEUDOEPHEDRINE HYDROCHLORIDE 1 TABLET: 5; 120 TABLET, FILM COATED, EXTENDED RELEASE ORAL at 09:12

## 2022-01-20 RX ADMIN — FAMOTIDINE 10 MG: 20 TABLET ORAL at 09:11

## 2022-01-20 RX ADMIN — IPRATROPIUM BROMIDE 0.5 MG: 0.5 SOLUTION RESPIRATORY (INHALATION) at 20:18

## 2022-01-20 RX ADMIN — SODIUM CHLORIDE, PRESERVATIVE FREE 10 ML: 5 INJECTION INTRAVENOUS at 05:31

## 2022-01-20 RX ADMIN — CALCIUM CARBONATE (ANTACID) CHEW TAB 500 MG 200 MG: 500 CHEW TAB at 13:34

## 2022-01-20 RX ADMIN — METHYLPREDNISOLONE SODIUM SUCCINATE 80 MG: 125 INJECTION, POWDER, FOR SOLUTION INTRAMUSCULAR; INTRAVENOUS at 01:15

## 2022-01-20 RX ADMIN — PANTOPRAZOLE SODIUM 40 MG: 40 TABLET, DELAYED RELEASE ORAL at 18:28

## 2022-01-20 RX ADMIN — IPRATROPIUM BROMIDE 0.5 MG: 0.5 SOLUTION RESPIRATORY (INHALATION) at 15:28

## 2022-01-20 RX ADMIN — AZITHROMYCIN DIHYDRATE 500 MG: 500 INJECTION, POWDER, LYOPHILIZED, FOR SOLUTION INTRAVENOUS at 18:28

## 2022-01-20 RX ADMIN — AZELASTINE HYDROCHLORIDE 1 SPRAY: 137 SPRAY, METERED NASAL at 18:29

## 2022-01-20 RX ADMIN — AZELASTINE HYDROCHLORIDE 1 SPRAY: 137 SPRAY, METERED NASAL at 09:11

## 2022-01-20 RX ADMIN — METHYLPREDNISOLONE SODIUM SUCCINATE 80 MG: 125 INJECTION, POWDER, FOR SOLUTION INTRAMUSCULAR; INTRAVENOUS at 13:34

## 2022-01-20 RX ADMIN — IPRATROPIUM BROMIDE 0.5 MG: 0.5 SOLUTION RESPIRATORY (INHALATION) at 11:58

## 2022-01-20 NOTE — ED NOTES
Verbal shift change report given to Jesus Hennessy RN (oncoming nurse) by Selena Enriquez RN  (offgoing nurse). Report included the following information SBAR, ED Summary, Intake/Output, MAR and Recent Results.

## 2022-01-20 NOTE — ED NOTES
Checked on pt and introduced myself. Pt resting comfortably in bed at this time. Lung sounds clear in all fields, pt mildly tachypneic with increased WOB. A&O x4, clear speech, follows commands, NAD. Bed in lowest position, call bell within reach, will CTM.

## 2022-01-20 NOTE — PROGRESS NOTES
[facility]                                                                                          Hospitalist Progress Note  Job MD Tanya  Answering service: [phone] OR [phone] from in house phone        Date of Service:  2022  NAME:  Dione Moralez  :  1985  MRN:  [id number]    This documentation was facilitated by a Voice Recognition software and may contain inadvertent typographical errors. 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 She never had childhood asthma. She has been suffering from frequent flareups requiring hospitalization, urgent care and PCP visit and receiving steroid use on top of her other respiratory therapeutics. She has been having respiratory difficulty and tachycardia since  with no relief despite high-dose steroid, bronchodilators inhaled steroids and allergy medications so she came to the ER on 2022. She is currently undergoing cardiac evaluation at [facility] referred by her pulmonologist for cardiac causes of her respiratory symptoms and tachycardia. Patient denied history of autoimmune disorders such as SLE, RA etc.    Interval history / Subjective:        Patient seen for follow-up of acute asthma exacerbation failed outpatient treatment. Patient seen and examined earlier today by me. Patient continues to be short of breath and tachycardic. She says that the steroids are not doing very much. Assessment & Plan:   Acute asthmatic exacerbation. She failed outpatient treatment. Has been having persistent symptoms since .  --CTA of the lung on admission negative for PE, showed bandlike foci of atelectasis/scarring in the lung bases and trace pericardial effusion. Respiratory viral panel including SARS-CoV-2 PCR negative. Continue IV steroids, inhaled steroids/LABA, scheduled and as needed bronchodilators. On empiric antibiotics with Zithromax. -- Pulmonary input appreciated. Symptoms can be exacerbated by GERD, GI consult was placed by pulmonary and they will do EGD tomorrow. Patient is requiring high doses of steroids. Continue steroids for now but we must consider other causes of her breathing issues. Sinus tachycardia related to asthmatic exacerbation: Patient has had 10 tachycardia during flareups that slowly improve while in the acute attacks resolved. -- CTA lung showed trace pericardial effusion, will go ahead and obtain echocardiogram  -- Patient currently undergoing cardiac evaluation at [facility] to look into nonpulmonary causes for her respiratory symptoms  Echocardiogram did not show anything new. Leukocytosis, this is most probably due to steroid induced, patient not septic. Monitor  GERD: Patient uses both pantoprazole and famotidine. Care Plan discussed with: Patient  Code status: Full code  DVT prophylaxis: Lovenox  Anticipated Disposition: Home in 1-2 days          Hospital Problems  Date Reviewed: 12/8/2021          Codes Class Noted POA    SOB (shortness of breath) ICD-10-CM: R06.02  ICD-9-CM: 786.05  1/18/2022 Unknown                Review of Systems:   A comprehensive review of systems was negative except for that written in the HPI. Vital Signs:    Last 24hrs VS reviewed since prior progress note. Most recent are:        Intake/Output Summary (Last 24 hours) at 1/20/2022 1558  Last data filed at 1/20/2022 0400  Gross per 24 hour   Intake 1825 ml   Output    Net 1825 ml        Physical Examination:     I had a face to face encounter with this patient and independently examined them on1/20/2022 as outlined below:        Constitutional:  Alert     HEENT:  Atraumatic. Oral mucosa moist,. Non icteric sclera. No pallor. Resp:   On room air, mildly tachypneic. Bilateral airflow sounds okay. No crackles or rails. No wheezing. No rhonchi. Chest Wall: No deformity     CV:  Regular rhythm, normal rate, no murmurs, gallops, rubs      GI:  Normoactive  Soft, non distended, non tender. No appreciable organomegaly      :  No CVA or suprapubic tenderness      Musculoskeletal:  No edema  No deformity  ROM intact      Neurologic:  Mental status:AAOx3,   Cranial nerves II-XII : WNL  Motor exam:Moves all extremities symmetrically                Data Review:    Review and/or order of clinical lab test  Review and/or order of tests in the radiology section of CPT  Review and/or order of tests in the medicine section of CPT      Available Lab and Imaging results reviewed and used to formulate the current care plan.       Medications Reviewed:     Current Facility-Administered Medications   Medication Dose Route Frequency    ipratropium (ATROVENT) 0.02 % nebulizer solution 0.5 mg  0.5 mg Nebulization Q4H RT    methylPREDNISolone (PF) (Solu-MEDROL) injection 80 mg  80 mg IntraVENous Q6H    calcium carbonate (TUMS) chewable tablet 200 mg [elemental]  200 mg Oral TID WITH MEALS    famotidine (PEPCID) tablet 10 mg  10 mg Oral DAILY    arformoteroL (BROVANA) neb solution 15 mcg  15 mcg Nebulization BID RT    And    budesonide (PULMICORT) 500 mcg/2 ml nebulizer suspension  500 mcg Nebulization BID RT    cetirizine-pseudoePHEDrine (ZyrTEC-D) 5-120 mg per tablet 1 Tablet (Patient Supplied)  1 Tablet Oral BID PRN    sodium chloride (NS) flush 5-40 mL  5-40 mL IntraVENous Q8H    sodium chloride (NS) flush 5-40 mL  5-40 mL IntraVENous PRN    azithromycin (ZITHROMAX) 500 mg in 0.9% sodium chloride 250 mL (VIAL-MATE)  500 mg IntraVENous Q24H    acetaminophen (TYLENOL) tablet 650 mg  650 mg Oral Q4H PRN    ondansetron (ZOFRAN) injection 4 mg  4 mg IntraVENous Q4H PRN    montelukast (SINGULAIR) tablet 10 mg  10 mg Oral QHS    albuterol-ipratropium (DUO-NEB) 2.5 MG-0.5 MG/3 ML  3 mL Nebulization Q6H PRN    hydrOXYzine HCL (ATARAX) tablet 50 mg  50 mg Oral TID PRN    fluticasone propionate (FLONASE) 50 mcg/actuation nasal spray 2 Spray  2 Spray Both Nostrils DAILY    azelastine (ASTELIN) 137mcg/spray nasal spray  1 Spray Both Nostrils BID    pantoprazole (PROTONIX) tablet 40 mg  40 mg Oral BID     Current Outpatient Medications   Medication Sig    hydrOXYzine HCL (ATARAX) 25 mg tablet Take 50 mg by mouth three (3) times daily as needed.  doxylamine succinate (UNISOM) 25 mg tablet Take 25 mg by mouth nightly as needed.  levocetirizine (Xyzal) 5 mg tablet Take 5 mg by mouth daily as needed for Allergies.  albuterol-ipratropium (DUO-NEB) 2.5 mg-0.5 mg/3 ml nebu 3 mL by Nebulization route every six (6) hours as needed for Wheezing or Shortness of Breath. Indications: bronchi muscle spasm resulting from COPD    predniSONE (DELTASONE) 10 mg tablet Take 40mg (4 tabs) daily for 3 days, then 20mg (2 tabs) daily for 3 days, then 10mg (1 tab) daily for 3 days    cetirizine-pseudoePHEDrine (ZyrTEC-D) 5-120 mg Tb12 Take 1 Tablet by mouth once over twenty-four (24) hours.  azelastine (ASTEPRO) 205.5 mcg (0.15 %) 2 Sprays two (2) times a day.  zafirlukast (ACCOLATE) 20 mg tablet Take 20 mg by mouth two (2) times a day.  pantoprazole (PROTONIX) 40 mg tablet Take 40 mg by mouth daily.  famotidine (PEPCID) 10 mg tablet Take 10 mg by mouth daily.  norgestimate-ethinyl estradiol (ORTHO-CYCLEN, SPRINT[facility]) 0.25-35 mg-mcg tab Take 1 Tab by mouth daily.  triamcinolone (NASACORT) 55 mcg nasal inhaler 2 Sprays daily.  budesonide-formoteroL (Symbicort) 160-4.5 mcg/actuation HFAA Take 2 Puffs by inhalation two (2) times a day. (Patient not taking: Reported on 1/18/2022)    guaiFENesin-dextromethorphan SR (MUCINEX DM) 600-30 mg per tablet Take 1 Tab by mouth two (2) times a day.  (Patient not taking: Reported on 1/18/2022)    albuterol sulfate 90 mcg/actuation aepb Take 2 Puffs by inhalation every four (4) hours as needed for Cough.  (Patient not taking: Reported on 1/18/2022)     ______________________________________________________________________  EXPECTED LENGTH OF STAY: - - -  ACTUAL LENGTH OF STAY:          [id number] [facility] Linda Ramirez MD

## 2022-01-20 NOTE — ED NOTES
Verbal shift change report given to Gunner Guo RN (oncoming nurse) by Calista Leyva RN (offgoing nurse). Report included the following information SBAR, ED Summary, Intake/Output, MAR and Recent Results.

## 2022-01-20 NOTE — CONSULTS
[address] [place] NOTE  [facility]  [phone] NP in-hospital cell phone M-F until 4:30  After 5pm or on weekends, please call  for physician on call        NAME:  Uzma Estrada   :   1985   MRN:   [id number]       Referring Physician: Chico Mcclure Date: 2022 2:18 PM    Chief Complaint: GERD     History of Present Illness:  Patient is a 39 y.o. who is seen in consultation at the request of SUSANNE Crockett for GERD. Medical history as listed below includes asthma. She reports asthma exacerbation since  requiring multiple hospitalizations. She has been on 80mg Prednisone. She reports chronic reflux, evaluated at [facility] in 2017, was having dysphagia at that time. She had EGD and sounds like manometry +/- ph study in 2017. She has been on 40mg Protonix BID since 2017. She added Pepcid 40mg and is taking pepcid chewables in addition. She gets crushing chest pain. She denies dysphagia, nausea, abdominal pain. Rare NSAID use. Minimal alcohol use. Denies Tobacco.    I have reviewed the emergency room note, hospital admission note, notes by all other clinicians who have seen the patient during this hospitalization to date. I have reviewed the problem list and the reason for this hospitalization. I have reviewed the allergies and the medications the patient was taking at home prior to this hospitalization. PMH:  Past Medical History:   Diagnosis Date    Asthma     GERD (gastroesophageal reflux disease)        PSH:  No past surgical history on file. Allergies:   Allergies   Allergen Reactions    Latex Other (comments)     rash    Galactose-Alpha-1,3-Galactose (Alpha-Gal) Anaphylaxis     Hives, gi upset    Phenazopyridine Other (comments)     Low bp, tacycardic, asthmatic flare    Sulfamethoxazole-Trimethoprim Other (comments)     Low bp, thacycardic, asthmatic flare       Home Medications:  Prior to Admission Medications   Prescriptions Last Dose Informant Patient Reported? Taking? albuterol sulfate 90 mcg/actuation aepb Not Taking at Unknown time  No No   Sig: Take 2 Puffs by inhalation every four (4) hours as needed for Cough. Patient not taking: Reported on 2022   albuterol-ipratropium (DUO-NEB) 2.5 mg-0.5 mg/3 ml nebu 2022 at 0700  No Yes   Sig: 3 mL by Nebulization route every six (6) hours as needed for Wheezing or Shortness of Breath. Indications: bronchi muscle spasm resulting from COPD   azelastine (ASTEPRO) 205.5 mcg (0.15 %) 2022 at 0900  Yes Yes   Si Sprays two (2) times a day. budesonide-formoteroL (Symbicort) 160-4.5 mcg/actuation HFAA Not Taking at Unknown time  Yes No   Sig: Take 2 Puffs by inhalation two (2) times a day. Patient not taking: Reported on 2022   cetirizine-pseudoePHEDrine (ZyrTEC-D) 5-120 mg Tb12 2022 at 0900  Yes Yes   Sig: Take 1 Tablet by mouth once over twenty-four (24) hours. doxylamine succinate (UNISOM) 25 mg tablet 2022 at Unknown time  Yes Yes   Sig: Take 25 mg by mouth nightly as needed. famotidine (PEPCID) 10 mg tablet 2022 at 0900  Yes Yes   Sig: Take 10 mg by mouth daily. guaiFENesin-dextromethorphan SR (MUCINEX DM) 600-30 mg per tablet Not Taking at Unknown time  Yes No   Sig: Take 1 Tab by mouth two (2) times a day. Patient not taking: Reported on 2022   hydrOXYzine HCL (ATARAX) 25 mg tablet 2021 at Unknown time  Yes Yes   Sig: Take 50 mg by mouth three (3) times daily as needed. levocetirizine (Xyzal) 5 mg tablet 2022 at Unknown time  Yes Yes   Sig: Take 5 mg by mouth daily as needed for Allergies. norgestimate-ethinyl estradiol (ORTHO-CYCLEN, [facility]) 0.25-35 mg-mcg tab 2022 at Unknown time  Yes Yes   Sig: Take 1 Tab by mouth daily. pantoprazole (PROTONIX) 40 mg tablet 2022 at 0900  Yes Yes   Sig: Take 40 mg by mouth daily. predniSONE (DELTASONE) 10 mg tablet 2022 at 0900  No Yes   Sig: Take 40mg (4 tabs) daily for 3 days, then 20mg (2 tabs) daily for 3 days, then 10mg (1 tab) daily for 3 days   triamcinolone (NASACORT) 55 mcg nasal inhaler 2022 at 0900  Yes Yes   Si Sprays daily. zafirlukast (ACCOLATE) 20 mg tablet 2022 at 0900  Yes Yes   Sig: Take 20 mg by mouth two (2) times a day.       Facility-Administered Medications: None       Hospital Medications:  Current Facility-Administered Medications   Medication Dose Route Frequency    ipratropium (ATROVENT) 0.02 % nebulizer solution 0.5 mg  0.5 mg Nebulization Q4H RT    methylPREDNISolone (PF) (Solu-MEDROL) injection 80 mg  80 mg IntraVENous Q6H    calcium carbonate (TUMS) chewable tablet 200 mg [elemental]  200 mg Oral TID WITH MEALS    famotidine (PEPCID) tablet 10 mg  10 mg Oral DAILY    arformoteroL (BROVANA) neb solution 15 mcg  15 mcg Nebulization BID RT    And    budesonide (PULMICORT) 500 mcg/2 ml nebulizer suspension  500 mcg Nebulization BID RT    cetirizine-pseudoePHEDrine (ZyrTEC-D) 5-120 mg per tablet 1 Tablet (Patient Supplied)  1 Tablet Oral BID PRN    sodium chloride (NS) flush 5-40 mL  5-40 mL IntraVENous Q8H    sodium chloride (NS) flush 5-40 mL  5-40 mL IntraVENous PRN    azithromycin (ZITHROMAX) 500 mg in 0.9% sodium chloride 250 mL (VIAL-MATE)  500 mg IntraVENous Q24H    acetaminophen (TYLENOL) tablet 650 mg  650 mg Oral Q4H PRN    ondansetron (ZOFRAN) injection 4 mg  4 mg IntraVENous Q4H PRN    montelukast (SINGULAIR) tablet 10 mg  10 mg Oral QHS    albuterol-ipratropium (DUO-NEB) 2.5 MG-0.5 MG/3 ML  3 mL Nebulization Q6H PRN    hydrOXYzine HCL (ATARAX) tablet 50 mg  50 mg Oral TID PRN    fluticasone propionate (FLONASE) 50 mcg/actuation nasal spray 2 Spray  2 Spray Both Nostrils DAILY    azelastine (ASTELIN) 137mcg/spray nasal spray  1 Spray Both Nostrils BID    pantoprazole (PROTONIX) tablet 40 mg  40 mg Oral BID Current Outpatient Medications   Medication Sig    hydrOXYzine HCL (ATARAX) 25 mg tablet Take 50 mg by mouth three (3) times daily as needed.  doxylamine succinate (UNISOM) 25 mg tablet Take 25 mg by mouth nightly as needed.  levocetirizine (Xyzal) 5 mg tablet Take 5 mg by mouth daily as needed for Allergies.  albuterol-ipratropium (DUO-NEB) 2.5 mg-0.5 mg/3 ml nebu 3 mL by Nebulization route every six (6) hours as needed for Wheezing or Shortness of Breath. Indications: bronchi muscle spasm resulting from COPD    predniSONE (DELTASONE) 10 mg tablet Take 40mg (4 tabs) daily for 3 days, then 20mg (2 tabs) daily for 3 days, then 10mg (1 tab) daily for 3 days    cetirizine-pseudoePHEDrine (ZyrTEC-D) 5-120 mg Tb12 Take 1 Tablet by mouth once over twenty-four (24) hours.  azelastine (ASTEPRO) 205.5 mcg (0.15 %) 2 Sprays two (2) times a day.  zafirlukast (ACCOLATE) 20 mg tablet Take 20 mg by mouth two (2) times a day.  pantoprazole (PROTONIX) 40 mg tablet Take 40 mg by mouth daily.  famotidine (PEPCID) 10 mg tablet Take 10 mg by mouth daily.  norgestimate-ethinyl estradiol (ORTHO-CYCLEN, SPRINTEC) 0.25-35 mg-mcg tab Take 1 Tab by mouth daily.  triamcinolone (NASACORT) 55 mcg nasal inhaler 2 Sprays daily.  budesonide-formoteroL (Symbicort) 160-4.5 mcg/actuation HFAA Take 2 Puffs by inhalation two (2) times a day. (Patient not taking: Reported on 1/18/2022)    guaiFENesin-dextromethorphan SR (MUCINEX DM) 600-30 mg per tablet Take 1 Tab by mouth two (2) times a day. (Patient not taking: Reported on 1/18/2022)    albuterol sulfate 90 mcg/actuation aepb Take 2 Puffs by inhalation every four (4) hours as needed for Cough.  (Patient not taking: Reported on 1/18/2022)       Social History:  Social History     Tobacco Use    Smoking status: Never Smoker    Smokeless tobacco: Never Used   Substance Use Topics    Alcohol use: Not on file       Family History:  No family history on file.    Review of Systems:  Constitutional: negative fever, negative chills, negative weight loss  Eyes:   negative visual changes  ENT:   negative sore throat, tongue or lip swelling  Respiratory:  negative cough, +dyspnea  Cards:  +for chest pain, palpitations, lower extremity edema  GI:   See HPI  :  negative for frequency, dysuria  Integument:  negative for rash and pruritus  Heme:  negative for easy bruising and gum/nose bleeding  Musculoskeletal:negative for myalgias, back pain and muscle weakness  Neuro:    +for headaches, dizziness, vertigo  Psych: negative for feelings of anxiety, depression     Objective:     Patient Vitals for the past 8 hrs:   BP Pulse Resp SpO2 Height   01/20/22 1158    98 %    01/20/22 1150  (!) 111 20 98 %    01/20/22 1022 (!) 130/90    5' (1.524 m)   01/20/22 0941  (!) 115 21 97 %    01/20/22 0806  (!) 122 21 96 %    01/20/22 0800 (!) 130/90       01/20/22 0711    97 %      No intake/output data recorded. 01/18 1901 - 01/20 0700  In: 3957.5 [I.V.:3957.5]  Out: -     EXAM:     CONST:  Pleasant lady lying in bed, no acute distress   NEURO:  alert and oriented x 3   HEENT: EOMI, no scleral icterus   LUNGS: No increased WOB   CARD:   Mild tachycardia   ABD:  soft, minimal epigastric tenderness, no rebound, no masses, non distended   EXT:  warm   PSYCH: full, not anxious     Data Review     Recent Labs     01/19/22  0250 01/18/22  1019   WBC 17.1* 14.3*   HGB 13.0 14.2   HCT 40.3 43.0    318     Recent Labs     01/19/22  0250 01/18/22  1019    139   K 3.7 4.0    106   CO2 21 29   BUN 14 20   CREA 0.93 0.82   * 85   CA 8.8 9.0     Recent Labs     01/18/22  1019   AP 29*   TP 6.6   ALB 3.6   GLOB 3.0     No results for input(s): INR, PTP, APTT, INREXT in the last 72 hours. Assessment:     · Heartburn  · Asthma  · Tachycardia   · COVID negative 1/18/22     Patient Active Problem List   Diagnosis Code    Asthma exacerbation J45. [address] [facility] GERD (gastroesophageal reflux disease) K21.9    Tachypnea R06.82    Dyspnea R06.00    Leukocytosis D72.829    Asthma J45.909    SOB (shortness of breath) R06.02     Plan:       · PPI and H2 blocker  · GERD diet  · NPO midnight  · Discussed with SUSANNE Choudhary with pulmonary - okay for EGD  · Plan for EGD tomorrow  · Patient discussed with Dr. Ying Kemp  · Thank you for allowing me to participate in care of Rachel Villalobos By: Everardo Orozco NP     1/20/2022  2:18 PM       Gastroenterology Attending Physician attestation statement and comments. This patient was seen and examined by me in a face-to-face visit today. I reviewed the medical record including lab work, imaging and other provider notes. I confirmed the history as described above. I spoke to the patient, reviewed the medical record including lab work, imaging and other provider notes. I discussed this case in detail with So Adams NP. I formulated an  assessment of this patient and developed a treatment plan. I agree with the above consultation note. I agree with the history, exam and assessment and plan as outlined in the note. I would like to add the following: Patient seen and examined. Agree with above plan. Discussed procedure details and risks and she is in agreement. Vijay Kemp MD

## 2022-01-20 NOTE — ROUTINE PROCESS
TRANSFER - OUT REPORT:    Verbal report given to Nathaniel Friedman RN (name) on Saadia Richardson  being transferred to ,  (unit) for routine progression of care       Report consisted of patients Situation, Background, Assessment and   Recommendations(SBAR). Information from the following report(s) SBAR, ED Summary and MAR was reviewed with the receiving nurse. Lines:   Peripheral IV 01/18/22 Left Antecubital (Active)   Site Assessment Clean, dry, & intact 01/20/22 0400   Phlebitis Assessment 0 01/20/22 0004   Infiltration Assessment 0 01/20/22 0004   Dressing Status Clean, dry, & intact 01/20/22 0004   Dressing Type Transparent 01/20/22 0004   Hub Color/Line Status Pink; Infusing 01/20/22 0004   Action Taken Open ports on tubing capped 01/20/22 0004   Alcohol Cap Used Yes 01/20/22 0004        Opportunity for questions and clarification was provided.       Patient transported with:   [employer]

## 2022-01-20 NOTE — PROGRESS NOTES
Pulmonary, Critical Care, and Sleep Medicine~Progress Note    Name: Sanjuanita Casas MRN: [id number]   : 1985 [facility]   Date: 2022 11:58 AM Admission: 2022     Impression Plan   1. AE of asthma. Followed by Dr Salvador Sterling. Previous IgE was around 319. On 20. On prednisone as an outpatient. Unclear how much   2. significant GERD, has seen GI before and has had minimal relief. Likely worsening her asthma  3. Tachycardia, per hospitalist   4. Allergies. On [place] injections with Dr Hayes Huff. 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 5. 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 ECHO pending  2. Pending IgE  3. She has significant GERD which has not been explored in a while. Will as GI to see patient  4. If no therapy works query [address]? 5. Follow serologies  6. Bronchodilators and finds relief   7. Stop IV fluids   8. Chest image today  9. O2 titration above 90%  10. Discussed with hospitalist      Daily Progression:    Feels about the same     I have reviewed the labs and previous days notes. Pertinent items are noted in HPI. OBJECTIVE:     Vital Signs:       Visit Vitals  BP (!) 130/90   Pulse (!) 111   Temp 98.2 °F (36.8 °C)   Resp 20   Ht 5' (1.524 m)   SpO2 98%   BMI 27.77 kg/m²      Temp (24hrs), Av.1 °F (36.7 °C), Min:97.7 °F (36.5 °C), Max:98.4 °F (36.9 °C)     Intake/Output:     Last shift: No intake/output data recorded.     Last 3 shifts:  1901 -  0700  In: 3957.5 [I.V.:3957.5]  Out: -           Intake/Output Summary (Last 24 hours) at 2022 1158  Last data filed at 2022 0400  Gross per 24 hour   Intake 1825 ml   Output    Net 1825 ml       Physical Exam:                                        Exam Findings Other   General: No resp distress noted, appears stated age    HEENT:  No ulcers, JVD not elevated, no cervical LAD    Chest: No pectus deformity, normal chest rise b/l    HEART: Tachy     Lungs:  diminished BS at bases    ABD: Soft/NT, non rigid mildly distended    EXT: No cyanosis/clubbing/edema, normal peripheral pulses    Skin: No rashes or ulcers, no mottling    Neuro: A/O x 3        Medications:  Current Facility-Administered Medications   Medication Dose Route Frequency    ipratropium (ATROVENT) 0.02 % nebulizer solution 0.5 mg  0.5 mg Nebulization Q4H RT    methylPREDNISolone (PF) (Solu-MEDROL) injection 80 mg  80 mg IntraVENous Q6H    calcium carbonate (TUMS) chewable tablet 200 mg [elemental]  200 mg Oral TID WITH MEALS    famotidine (PEPCID) tablet 10 mg  10 mg Oral DAILY    arformoteroL (BROVANA) neb solution 15 mcg  15 mcg Nebulization BID RT    And    budesonide (PULMICORT) 500 mcg/2 ml nebulizer suspension  500 mcg Nebulization BID RT    cetirizine-pseudoePHEDrine (ZyrTEC-D) 5-120 mg per tablet 1 Tablet (Patient Supplied)  1 Tablet Oral BID PRN    sodium chloride (NS) flush 5-40 mL  5-40 mL IntraVENous Q8H    sodium chloride (NS) flush 5-40 mL  5-40 mL IntraVENous PRN    azithromycin (ZITHROMAX) 500 mg in 0.9% sodium chloride 250 mL (VIAL-MATE)  500 mg IntraVENous Q24H    acetaminophen (TYLENOL) tablet 650 mg  650 mg Oral Q4H PRN    ondansetron (ZOFRAN) injection 4 mg  4 mg IntraVENous Q4H PRN    montelukast (SINGULAIR) tablet 10 mg  10 mg Oral QHS    albuterol-ipratropium (DUO-NEB) 2.5 MG-0.5 MG/3 ML  3 mL Nebulization Q6H PRN    hydrOXYzine HCL (ATARAX) tablet 50 mg  50 mg Oral TID PRN    fluticasone propionate (FLONASE) 50 mcg/actuation nasal spray 2 Spray  2 Spray Both Nostrils DAILY    azelastine (ASTELIN) 137mcg/spray nasal spray  1 Spray Both Nostrils BID    pantoprazole (PROTONIX) tablet 40 mg  40 mg Oral BID     Current Outpatient Medications   Medication Sig    hydrOXYzine HCL (ATARAX) 25 mg tablet Take 50 mg by mouth three (3) times daily as needed.  doxylamine succinate (UNISOM) 25 mg tablet Take 25 mg by mouth nightly as needed.     levocetirizine (Xyzal) 5 mg tablet Take 5 mg by mouth daily as needed for Allergies.  albuterol-ipratropium (DUO-NEB) 2.5 mg-0.5 mg/3 ml nebu 3 mL by Nebulization route every six (6) hours as needed for Wheezing or Shortness of Breath. Indications: bronchi muscle spasm resulting from COPD    predniSONE (DELTASONE) 10 mg tablet Take 40mg (4 tabs) daily for 3 days, then 20mg (2 tabs) daily for 3 days, then 10mg (1 tab) daily for 3 days    cetirizine-pseudoePHEDrine (ZyrTEC-D) 5-120 mg Tb12 Take 1 Tablet by mouth once over twenty-four (24) hours.  azelastine (ASTEPRO) 205.5 mcg (0.15 %) 2 Sprays two (2) times a day.  zafirlukast (ACCOLATE) 20 mg tablet Take 20 mg by mouth two (2) times a day.  pantoprazole (PROTONIX) 40 mg tablet Take 40 mg by mouth daily.  famotidine (PEPCID) 10 mg tablet Take 10 mg by mouth daily.  norgestimate-ethinyl estradiol (ORTHO-CYCLEN, SPRINTEC) 0.25-35 mg-mcg tab Take 1 Tab by mouth daily.  triamcinolone (NASACORT) 55 mcg nasal inhaler 2 Sprays daily.  budesonide-formoteroL (Symbicort) 160-4.5 mcg/actuation HFAA Take 2 Puffs by inhalation two (2) times a day. (Patient not taking: Reported on 1/18/2022)    guaiFENesin-dextromethorphan SR (MUCINEX DM) 600-30 mg per tablet Take 1 Tab by mouth two (2) times a day. (Patient not taking: Reported on 1/18/2022)    albuterol sulfate 90 mcg/actuation aepb Take 2 Puffs by inhalation every four (4) hours as needed for Cough.  (Patient not taking: Reported on 1/18/2022)       Labs:  ABG Recent Labs     01/18/22 2024   PHI 7.39   PCO2I 33.2*   PO2I 87   HCO3I 20.2*   SO2I 96.6        CBC Recent Labs     01/19/22  0250 01/18/22  1019   WBC 17.1* 14.3*   HGB 13.0 14.2   HCT 40.3 43.0    318   MCV 88.2 86.7   MCH 28.4 23.8        Metabolic  Panel Recent Labs     01/19/22  0250 01/18/22  1019    139   K 3.7 4.0    106   CO2 21 29   * 85   BUN 14 20   CREA 0.93 0.82   CA 8.8 9.0   ALB  --  3.6   ALT  --  28 Pertinent Labs                Tedi Nissen, PA-C  1/20/2022

## 2022-01-20 NOTE — PROGRESS NOTES
0100:  Bedside shift change report given to Klever Vieyra (oncoming nurse) by Kristian Banerjee (offgoing nurse). Report included the following information SBAR, Kardex, MAR, Cardiac Rhythm ST/SR and Alarm Parameters      0115: IV solumedrol given. 0300: AM labs drawn. Patient ambulates to bathroom to void    0600: IV solumedrol. Patient denies needs. 0730: Bedside shift change report given to Nir (oncoming nurse) by Klever Vieyra (offgoing nurse). Report included the following information SBAR, Kardex, MAR and Cardiac Rhythm Sinus Tachycardia.

## 2022-01-21 ENCOUNTER — ANESTHESIA EVENT (OUTPATIENT)
Dept: ENDOSCOPY | Age: 37
DRG: 202 | End: 2022-01-21
Payer: COMMERCIAL

## 2022-01-21 ENCOUNTER — ANESTHESIA (OUTPATIENT)
Dept: ENDOSCOPY | Age: 37
DRG: 202 | End: 2022-01-21
Payer: COMMERCIAL

## 2022-01-21 LAB
ANA SER QL: NEGATIVE
ANION GAP SERPL CALC-SCNC: 7 MMOL/L (ref 5–15)
BASOPHILS # BLD: 0 K/UL (ref 0–0.1)
BASOPHILS NFR BLD: 0 % (ref 0–1)
BUN SERPL-MCNC: 18 MG/DL (ref 6–20)
BUN/CREAT SERPL: 23 (ref 12–20)
CALCIUM SERPL-MCNC: 8.9 MG/DL (ref 8.5–10.1)
CHLORIDE SERPL-SCNC: 106 MMOL/L (ref 97–108)
CO2 SERPL-SCNC: 26 MMOL/L (ref 21–32)
CREAT SERPL-MCNC: 0.78 MG/DL (ref 0.55–1.02)
D DIMER PPP FEU-MCNC: 0.32 MG/L FEU (ref 0–0.65)
DIFFERENTIAL METHOD BLD: ABNORMAL
EOSINOPHIL # BLD: 0 K/UL (ref 0–0.4)
EOSINOPHIL NFR BLD: 0 % (ref 0–7)
ERYTHROCYTE [DISTWIDTH] IN BLOOD BY AUTOMATED COUNT: 14.6 % (ref 11.5–14.5)
GLUCOSE SERPL-MCNC: 136 MG/DL (ref 65–100)
HCT VFR BLD AUTO: 38.3 % (ref 35–47)
HGB BLD-MCNC: 12.2 G/DL (ref 11.5–16)
IMM GRANULOCYTES # BLD AUTO: 0 K/UL
IMM GRANULOCYTES NFR BLD AUTO: 0 %
LYMPHOCYTES # BLD: 0.7 K/UL (ref 0.8–3.5)
LYMPHOCYTES NFR BLD: 4 % (ref 12–49)
MCH RBC QN AUTO: 28.2 PG (ref 26–34)
MCHC RBC AUTO-ENTMCNC: 31.9 G/DL (ref 30–36.5)
MCV RBC AUTO: 88.7 FL (ref 80–99)
MONOCYTES # BLD: 0.4 K/UL (ref 0–1)
MONOCYTES NFR BLD: 2 % (ref 5–13)
MYELOCYTES NFR BLD MANUAL: 1 %
NEUTS SEG # BLD: 17.2 K/UL (ref 1.8–8)
NEUTS SEG NFR BLD: 93 % (ref 32–75)
NRBC # BLD: 0 K/UL (ref 0–0.01)
NRBC BLD-RTO: 0 PER 100 WBC
PLATELET # BLD AUTO: 314 K/UL (ref 150–400)
PMV BLD AUTO: 9.4 FL (ref 8.9–12.9)
POTASSIUM SERPL-SCNC: 4.1 MMOL/L (ref 3.5–5.1)
RBC # BLD AUTO: 4.32 M/UL (ref 3.8–5.2)
RBC MORPH BLD: ABNORMAL
SODIUM SERPL-SCNC: 139 MMOL/L (ref 136–145)
TROPONIN-HIGH SENSITIVITY: 19 NG/L (ref 0–51)
WBC # BLD AUTO: 18.5 K/UL (ref 3.6–11)

## 2022-01-21 PROCEDURE — 36415 COLL VENOUS BLD VENIPUNCTURE: CPT

## 2022-01-21 PROCEDURE — 76040000019: Performed by: INTERNAL MEDICINE

## 2022-01-21 PROCEDURE — 74011000250 HC RX REV CODE- 250: Performed by: HOSPITALIST

## 2022-01-21 PROCEDURE — 74011250637 HC RX REV CODE- 250/637: Performed by: FAMILY MEDICINE

## 2022-01-21 PROCEDURE — 74011000250 HC RX REV CODE- 250: Performed by: FAMILY MEDICINE

## 2022-01-21 PROCEDURE — 80048 BASIC METABOLIC PNL TOTAL CA: CPT

## 2022-01-21 PROCEDURE — 74011000250 HC RX REV CODE- 250: Performed by: INTERNAL MEDICINE

## 2022-01-21 PROCEDURE — 93005 ELECTROCARDIOGRAM TRACING: CPT

## 2022-01-21 PROCEDURE — 0DB98ZX EXCISION OF DUODENUM, VIA NATURAL OR ARTIFICIAL OPENING ENDOSCOPIC, DIAGNOSTIC: ICD-10-PCS | Performed by: INTERNAL MEDICINE

## 2022-01-21 PROCEDURE — 65660000000 HC RM CCU STEPDOWN

## 2022-01-21 PROCEDURE — 85025 COMPLETE CBC W/AUTO DIFF WBC: CPT

## 2022-01-21 PROCEDURE — 74011000250 HC RX REV CODE- 250: Performed by: ANESTHESIOLOGY

## 2022-01-21 PROCEDURE — 94640 AIRWAY INHALATION TREATMENT: CPT

## 2022-01-21 PROCEDURE — 74011250637 HC RX REV CODE- 250/637: Performed by: HOSPITALIST

## 2022-01-21 PROCEDURE — 74011250636 HC RX REV CODE- 250/636: Performed by: ANESTHESIOLOGY

## 2022-01-21 PROCEDURE — 88305 TISSUE EXAM BY PATHOLOGIST: CPT

## 2022-01-21 PROCEDURE — 77010033678 HC OXYGEN DAILY

## 2022-01-21 PROCEDURE — 74011250636 HC RX REV CODE- 250/636: Performed by: FAMILY MEDICINE

## 2022-01-21 PROCEDURE — 84484 ASSAY OF TROPONIN QUANT: CPT

## 2022-01-21 PROCEDURE — 94664 DEMO&/EVAL PT USE INHALER: CPT

## 2022-01-21 PROCEDURE — 0DB58ZX EXCISION OF ESOPHAGUS, VIA NATURAL OR ARTIFICIAL OPENING ENDOSCOPIC, DIAGNOSTIC: ICD-10-PCS | Performed by: INTERNAL MEDICINE

## 2022-01-21 PROCEDURE — 94760 N-INVAS EAR/PLS OXIMETRY 1: CPT

## 2022-01-21 PROCEDURE — 0DB68ZX EXCISION OF STOMACH, VIA NATURAL OR ARTIFICIAL OPENING ENDOSCOPIC, DIAGNOSTIC: ICD-10-PCS | Performed by: INTERNAL MEDICINE

## 2022-01-21 PROCEDURE — 74011250637 HC RX REV CODE- 250/637: Performed by: NURSE PRACTITIONER

## 2022-01-21 PROCEDURE — 76060000031 HC ANESTHESIA FIRST 0.5 HR: Performed by: INTERNAL MEDICINE

## 2022-01-21 PROCEDURE — 85379 FIBRIN DEGRADATION QUANT: CPT

## 2022-01-21 PROCEDURE — 74011250636 HC RX REV CODE- 250/636: Performed by: INTERNAL MEDICINE

## 2022-01-21 PROCEDURE — 2709999900 HC NON-CHARGEABLE SUPPLY: Performed by: INTERNAL MEDICINE

## 2022-01-21 PROCEDURE — 77030021593 HC FCPS BIOP ENDOSC BSC -A: Performed by: INTERNAL MEDICINE

## 2022-01-21 RX ORDER — SODIUM CHLORIDE 9 MG/ML
INJECTION, SOLUTION INTRAVENOUS
Status: DISCONTINUED | OUTPATIENT
Start: 2022-01-21 | End: 2022-01-21 | Stop reason: HOSPADM

## 2022-01-21 RX ORDER — OXYCODONE HYDROCHLORIDE 5 MG/1
2.5 TABLET ORAL ONCE
Status: COMPLETED | OUTPATIENT
Start: 2022-01-21 | End: 2022-01-21

## 2022-01-21 RX ORDER — FAMOTIDINE 20 MG/1
10 TABLET, FILM COATED ORAL DAILY
Status: DISCONTINUED | OUTPATIENT
Start: 2022-01-22 | End: 2022-01-21 | Stop reason: SDUPTHER

## 2022-01-21 RX ORDER — FLUMAZENIL 0.1 MG/ML
0.2 INJECTION INTRAVENOUS
Status: DISCONTINUED | OUTPATIENT
Start: 2022-01-21 | End: 2022-01-21 | Stop reason: HOSPADM

## 2022-01-21 RX ORDER — SODIUM CHLORIDE 0.9 % (FLUSH) 0.9 %
5-40 SYRINGE (ML) INJECTION EVERY 8 HOURS
Status: DISCONTINUED | OUTPATIENT
Start: 2022-01-21 | End: 2022-01-24 | Stop reason: HOSPADM

## 2022-01-21 RX ORDER — SODIUM CHLORIDE 9 MG/ML
50 INJECTION, SOLUTION INTRAVENOUS CONTINUOUS
Status: DISPENSED | OUTPATIENT
Start: 2022-01-21 | End: 2022-01-21

## 2022-01-21 RX ORDER — MIDAZOLAM HYDROCHLORIDE 1 MG/ML
.25-5 INJECTION, SOLUTION INTRAMUSCULAR; INTRAVENOUS
Status: DISCONTINUED | OUTPATIENT
Start: 2022-01-21 | End: 2022-01-21 | Stop reason: HOSPADM

## 2022-01-21 RX ORDER — ALPRAZOLAM 0.25 MG/1
0.25 TABLET ORAL
Status: DISCONTINUED | OUTPATIENT
Start: 2022-01-21 | End: 2022-01-24 | Stop reason: HOSPADM

## 2022-01-21 RX ORDER — FENTANYL CITRATE 50 UG/ML
25-200 INJECTION, SOLUTION INTRAMUSCULAR; INTRAVENOUS
Status: DISCONTINUED | OUTPATIENT
Start: 2022-01-21 | End: 2022-01-21 | Stop reason: HOSPADM

## 2022-01-21 RX ORDER — EPINEPHRINE 0.1 MG/ML
1 INJECTION INTRACARDIAC; INTRAVENOUS
Status: DISCONTINUED | OUTPATIENT
Start: 2022-01-21 | End: 2022-01-21 | Stop reason: HOSPADM

## 2022-01-21 RX ORDER — NALOXONE HYDROCHLORIDE 0.4 MG/ML
0.4 INJECTION, SOLUTION INTRAMUSCULAR; INTRAVENOUS; SUBCUTANEOUS
Status: DISCONTINUED | OUTPATIENT
Start: 2022-01-21 | End: 2022-01-21 | Stop reason: HOSPADM

## 2022-01-21 RX ORDER — DEXTROMETHORPHAN/PSEUDOEPHED 2.5-7.5/.8
1.2 DROPS ORAL
Status: DISCONTINUED | OUTPATIENT
Start: 2022-01-21 | End: 2022-01-21 | Stop reason: HOSPADM

## 2022-01-21 RX ORDER — ATROPINE SULFATE 0.1 MG/ML
0.5 INJECTION INTRAVENOUS
Status: DISCONTINUED | OUTPATIENT
Start: 2022-01-21 | End: 2022-01-21 | Stop reason: HOSPADM

## 2022-01-21 RX ORDER — PROPOFOL 10 MG/ML
INJECTION, EMULSION INTRAVENOUS AS NEEDED
Status: DISCONTINUED | OUTPATIENT
Start: 2022-01-21 | End: 2022-01-21 | Stop reason: HOSPADM

## 2022-01-21 RX ORDER — LIDOCAINE HYDROCHLORIDE 20 MG/ML
INJECTION, SOLUTION EPIDURAL; INFILTRATION; INTRACAUDAL; PERINEURAL AS NEEDED
Status: DISCONTINUED | OUTPATIENT
Start: 2022-01-21 | End: 2022-01-21 | Stop reason: HOSPADM

## 2022-01-21 RX ORDER — SODIUM CHLORIDE 0.9 % (FLUSH) 0.9 %
5-40 SYRINGE (ML) INJECTION AS NEEDED
Status: DISCONTINUED | OUTPATIENT
Start: 2022-01-21 | End: 2022-01-24 | Stop reason: HOSPADM

## 2022-01-21 RX ORDER — PREDNISONE 20 MG/1
40 TABLET ORAL
Status: DISCONTINUED | OUTPATIENT
Start: 2022-01-22 | End: 2022-01-24 | Stop reason: HOSPADM

## 2022-01-21 RX ADMIN — PROPOFOL 50 MG: 10 INJECTION, EMULSION INTRAVENOUS at 11:22

## 2022-01-21 RX ADMIN — SODIUM CHLORIDE, PRESERVATIVE FREE 10 ML: 5 INJECTION INTRAVENOUS at 22:15

## 2022-01-21 RX ADMIN — CALCIUM CARBONATE (ANTACID) CHEW TAB 500 MG 200 MG: 500 CHEW TAB at 12:15

## 2022-01-21 RX ADMIN — PROPOFOL 50 MG: 10 INJECTION, EMULSION INTRAVENOUS at 11:23

## 2022-01-21 RX ADMIN — Medication 3 MG: at 22:14

## 2022-01-21 RX ADMIN — LIDOCAINE HYDROCHLORIDE 100 MG: 20 INJECTION, SOLUTION EPIDURAL; INFILTRATION; INTRACAUDAL; PERINEURAL at 11:17

## 2022-01-21 RX ADMIN — SODIUM CHLORIDE, PRESERVATIVE FREE 10 ML: 5 INJECTION INTRAVENOUS at 06:14

## 2022-01-21 RX ADMIN — PROPOFOL 120 MG: 10 INJECTION, EMULSION INTRAVENOUS at 11:17

## 2022-01-21 RX ADMIN — PROPOFOL 30 MG: 10 INJECTION, EMULSION INTRAVENOUS at 11:20

## 2022-01-21 RX ADMIN — SODIUM CHLORIDE: 900 INJECTION, SOLUTION INTRAVENOUS at 11:10

## 2022-01-21 RX ADMIN — ALPRAZOLAM 0.25 MG: 0.25 TABLET ORAL at 22:19

## 2022-01-21 RX ADMIN — AZELASTINE HYDROCHLORIDE 1 SPRAY: 137 SPRAY, METERED NASAL at 19:05

## 2022-01-21 RX ADMIN — PROPOFOL 50 MG: 10 INJECTION, EMULSION INTRAVENOUS at 11:19

## 2022-01-21 RX ADMIN — IPRATROPIUM BROMIDE 0.5 MG: 0.5 SOLUTION RESPIRATORY (INHALATION) at 16:49

## 2022-01-21 RX ADMIN — METHYLPREDNISOLONE SODIUM SUCCINATE 80 MG: 125 INJECTION, POWDER, FOR SOLUTION INTRAMUSCULAR; INTRAVENOUS at 06:13

## 2022-01-21 RX ADMIN — METHYLPREDNISOLONE SODIUM SUCCINATE 80 MG: 125 INJECTION, POWDER, FOR SOLUTION INTRAMUSCULAR; INTRAVENOUS at 00:15

## 2022-01-21 RX ADMIN — AZITHROMYCIN DIHYDRATE 500 MG: 500 INJECTION, POWDER, LYOPHILIZED, FOR SOLUTION INTRAVENOUS at 18:02

## 2022-01-21 RX ADMIN — OXYCODONE HYDROCHLORIDE 2.5 MG: 5 TABLET ORAL at 22:14

## 2022-01-21 RX ADMIN — IPRATROPIUM BROMIDE 0.5 MG: 0.5 SOLUTION RESPIRATORY (INHALATION) at 19:11

## 2022-01-21 RX ADMIN — IPRATROPIUM BROMIDE 0.5 MG: 0.5 SOLUTION RESPIRATORY (INHALATION) at 07:41

## 2022-01-21 RX ADMIN — PANTOPRAZOLE SODIUM 40 MG: 40 TABLET, DELAYED RELEASE ORAL at 18:02

## 2022-01-21 RX ADMIN — ALPRAZOLAM 0.25 MG: 0.25 TABLET ORAL at 15:47

## 2022-01-21 RX ADMIN — ARFORMOTEROL TARTRATE 15 MCG: 15 SOLUTION RESPIRATORY (INHALATION) at 19:11

## 2022-01-21 RX ADMIN — BUDESONIDE 500 MCG: 0.5 INHALANT RESPIRATORY (INHALATION) at 19:11

## 2022-01-21 RX ADMIN — CALCIUM CARBONATE (ANTACID) CHEW TAB 500 MG 200 MG: 500 CHEW TAB at 18:02

## 2022-01-21 RX ADMIN — BUDESONIDE 500 MCG: 0.5 INHALANT RESPIRATORY (INHALATION) at 07:41

## 2022-01-21 RX ADMIN — MONTELUKAST 10 MG: 10 TABLET, FILM COATED ORAL at 22:15

## 2022-01-21 RX ADMIN — FAMOTIDINE 10 MG: 20 TABLET ORAL at 12:16

## 2022-01-21 RX ADMIN — CETIRIZINE HYDROCHLORIDE, PSEUDOEPHEDRINE HYDROCHLORIDE 1 TABLET: 5; 120 TABLET, FILM COATED, EXTENDED RELEASE ORAL at 09:44

## 2022-01-21 RX ADMIN — AZELASTINE HYDROCHLORIDE 1 SPRAY: 137 SPRAY, METERED NASAL at 09:44

## 2022-01-21 RX ADMIN — PANTOPRAZOLE SODIUM 40 MG: 40 TABLET, DELAYED RELEASE ORAL at 12:16

## 2022-01-21 RX ADMIN — IPRATROPIUM BROMIDE 0.5 MG: 0.5 SOLUTION RESPIRATORY (INHALATION) at 02:02

## 2022-01-21 RX ADMIN — ARFORMOTEROL TARTRATE 15 MCG: 15 SOLUTION RESPIRATORY (INHALATION) at 07:40

## 2022-01-21 NOTE — PROGRESS NOTES
[facility] Adult  Hospitalist Group                                                                                          Hospitalist Progress Note  Angelica Gustafson MD  Answering service: [phone] OR [phone] from in house phone        Date of Service:  2022  NAME:  Uzma Estrada  :  1985  MRN:  [id number]    This documentation was facilitated by a Voice Recognition software and may contain inadvertent typographical errors. 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 She never had childhood asthma. She has been suffering from frequent flareups requiring hospitalization, urgent care and PCP visit and receiving steroid use on top of her other respiratory therapeutics. She has been having respiratory difficulty and tachycardia since  with no relief despite high-dose steroid, bronchodilators inhaled steroids and allergy medications so she came to the ER on 2022. She is currently undergoing cardiac evaluation at [address] [place] [address] referred by her pulmonologist for cardiac causes of her respiratory symptoms and tachycardia. Patient denied history of autoimmune disorders such as SLE, RA etc.    Interval history / Subjective:        Patient seen and examined, her  at the bedside. She still has significant exertional dyspnea and tachycardia. Not hypoxic, no wheezing. Assessment & Plan:   Acute asthmatic exacerbation. She failed outpatient treatment. Has been having persistent symptoms since .  --CTA of the lung on admission negative for PE, showed bandlike foci of atelectasis/scarring in the lung bases and trace pericardial effusion. Respiratory viral panel including SARS-CoV-2 PCR negative.   Change IV Solu-Medrol to prednisone, continue inhaled steroid/LABA  --EGD showed normal esophagus, stomach and duodenum, biopsies taken. --On PPI and H2 blockers at home. Sinus tachycardia related to asthmatic exacerbation: Patient has had 10 tachycardia during flareups that slowly improve while in the acute attacks resolved. -- CTA lung showed trace pericardial effusion, will go ahead and obtain echocardiogram  -- Patient currently undergoing cardiac evaluation at [facility] to look into nonpulmonary causes for her respiratory symptoms  Echocardiogram did not show anything new, no pericardial effusion. Leukocytosis, this is most probably due to steroid induced, patient not septic. Monitor    GERD: Patient uses both pantoprazole and famotidine. Care Plan discussed with: Patient  Code status: Full code  DVT prophylaxis: Lovenox  Anticipated Disposition: Expect home tomorrow if she remains stable. Discharge care plan discussed with patient and her  at the bedside. Hospital Problems  Date Reviewed: 12/8/2021          Codes Class Noted POA    SOB (shortness of breath) ICD-10-CM: R06.02  ICD-9-CM: 786.05  1/18/2022 Unknown                Review of Systems:   A comprehensive review of systems was negative except for that written in the HPI. Vital Signs:    Last 24hrs VS reviewed since prior progress note. Most recent are:        Intake/Output Summary (Last 24 hours) at 1/21/2022 1640  Last data filed at 1/21/2022 1400  Gross per 24 hour   Intake 450 ml   Output    Net 450 ml        Physical Examination:     I had a face to face encounter with this patient and independently examined them on1/21/2022 as outlined below:        Constitutional:  Alert     HEENT:  Atraumatic. Oral mucosa moist,. Non icteric sclera. No pallor. Resp:  On room air, clear air entry bilaterally without wheezing or rhonchi or any other added sounds.    Chest Wall: No deformity     CV:  Regular rhythm, normal rate, no murmurs, gallops, rubs      GI: Normoactive  Soft, non distended, non tender. No appreciable organomegaly      :  No CVA or suprapubic tenderness      Musculoskeletal:  No edema  No deformity  ROM intact      Neurologic:  Mental status:AAOx3,   Cranial nerves II-XII : WNL  Motor exam:Moves all extremities symmetrically                Data Review:    Review and/or order of clinical lab test  Review and/or order of tests in the radiology section of CPT  Review and/or order of tests in the medicine section of CPT      Available Lab and Imaging results reviewed and used to formulate the current care plan.       Medications Reviewed:     Current Facility-Administered Medications   Medication Dose Route Frequency    sodium chloride (NS) flush 5-40 mL  5-40 mL IntraVENous Q8H    sodium chloride (NS) flush 5-40 mL  5-40 mL IntraVENous PRN    [START ON 1/22/2022] predniSONE (DELTASONE) tablet 40 mg  40 mg Oral DAILY WITH BREAKFAST    ALPRAZolam (XANAX) tablet 0.25 mg  0.25 mg Oral TID PRN    melatonin tablet 3 mg  3 mg Oral QHS    ipratropium (ATROVENT) 0.02 % nebulizer solution 0.5 mg  0.5 mg Nebulization Q4H RT    calcium carbonate (TUMS) chewable tablet 200 mg [elemental]  200 mg Oral TID WITH MEALS    famotidine (PEPCID) tablet 10 mg  10 mg Oral DAILY    arformoteroL (BROVANA) neb solution 15 mcg  15 mcg Nebulization BID RT    And    budesonide (PULMICORT) 500 mcg/2 ml nebulizer suspension  500 mcg Nebulization BID RT    cetirizine-pseudoePHEDrine (ZyrTEC-D) 5-120 mg per tablet 1 Tablet (Patient Supplied)  1 Tablet Oral BID PRN    sodium chloride (NS) flush 5-40 mL  5-40 mL IntraVENous Q8H    sodium chloride (NS) flush 5-40 mL  5-40 mL IntraVENous PRN    azithromycin (ZITHROMAX) 500 mg in 0.9% sodium chloride 250 mL (VIAL-MATE)  500 mg IntraVENous Q24H    acetaminophen (TYLENOL) tablet 650 mg  650 mg Oral Q4H PRN    ondansetron (ZOFRAN) injection 4 mg  4 mg IntraVENous Q4H PRN    montelukast (SINGULAIR) tablet 10 mg  10 mg Oral QHS  albuterol-ipratropium (DUO-NEB) 2.5 MG-0.5 MG/3 ML  3 mL Nebulization Q6H PRN    hydrOXYzine HCL (ATARAX) tablet 50 mg  50 mg Oral TID PRN    fluticasone propionate (FLONASE) 50 mcg/actuation nasal spray 2 Spray  2 Spray Both Nostrils DAILY    azelastine (ASTELIN) 137mcg/spray nasal spray  1 Spray Both Nostrils BID    pantoprazole (PROTONIX) tablet 40 mg  40 mg Oral BID     ______________________________________________________________________  EXPECTED LENGTH OF STAY: 3d 0h  ACTUAL LENGTH OF STAY:          3                 Bryon Pham MD

## 2022-01-21 NOTE — PROGRESS NOTES
TRANSFER - IN REPORT:    Verbal report received from [facility] (name) on Mehdi Yap  being received from ER (unit) for routine progression of care      Report consisted of patients Situation, Background, Assessment and   Recommendations(SBAR). Information from the following report(s) SBAR, Kardex, Intake/Output and MAR was reviewed with the receiving nurse. Opportunity for questions and clarification was provided. Assessment completed upon patients arrival to unit and care assumed.

## 2022-01-21 NOTE — PROGRESS NOTES
Chest pain this afternoon differed slightly than previous chest pain, EKG done again with results as before.

## 2022-01-21 NOTE — PROGRESS NOTES
Transition of Care Plan   RUR- 9% Moderate Risk   DISPOSITION: The disposition plan is home with family assistance.  F/U with PCP/Specialist     Transport: Family     At this time patient does not have any CM needs. Patient to discharge home when stable.     Alix Nyhan, MSW, CRM, LMHP-e

## 2022-01-21 NOTE — ANESTHESIA POSTPROCEDURE EVALUATION
Post-Anesthesia Evaluation and Assessment    Patient: Alex Caldera MRN: [id number]  SSN: [id number]    YOB: 1985  Age: 39 y.o. Sex: female      I have evaluated the patient and they are stable and ready for discharge from the PACU. Cardiovascular Function/Vital Signs  Visit Vitals  /76   Pulse (!) 101   Temp 36.8 °C (98.2 °F)   Resp 22   Ht 5' (1.524 m)   Wt 64.4 kg (142 lb)   SpO2 96%   Breastfeeding No   BMI 27.73 kg/m²       Patient is status post MAC anesthesia for Procedure(s):  ESOPHAGOGASTRODUODENOSCOPY (EGD)  ESOPHAGOGASTRODUODENAL (EGD) BIOPSY. Nausea/Vomiting: None    Postoperative hydration reviewed and adequate. Pain:  Pain Scale 1: Numeric (0 - 10) (01/21/22 1047)  Pain Intensity 1: 0 (01/21/22 1047)   Managed    Neurological Status: At baseline    Mental Status, Level of Consciousness: Alert and  oriented to person, place, and time    Pulmonary Status:   O2 Device: Nasal cannula (01/21/22 1125)   Adequate oxygenation and airway patent    Complications related to anesthesia: None    Post-anesthesia assessment completed. No concerns    Signed By: Dariela Jang MD     January 21, 2022              Procedure(s):  ESOPHAGOGASTRODUODENOSCOPY (EGD)  ESOPHAGOGASTRODUODENAL (EGD) BIOPSY. MAC    <BSHSIANPOST>    INITIAL Post-op Vital signs: No vitals data found for the desired time range.

## 2022-01-21 NOTE — ADT AUTH CERT NOTES
Asthma - Care Day 3 (1/20/2022) by Yan Dill RN       Review Status Review Entered   Completed 1/21/2022 09:54      Criteria Review      Care Day: 3 Care Date: 1/20/2022 Level of Care: Telemetry    Guideline Day 2    Level Of Care    ( ) Floor to discharge    1/21/2022 09:54:06 EST by Angella Brown      1/20 tele    Clinical Status    ( ) * No requirement for assisted ventilation    ( ) * Hemodynamic stability    1/21/2022 09:54:06 EST by Angella Brown      hr 122 115 111 108 102 109 140 104   99.3 115 130/90 23 98% 2l nc    ( ) * Infection absent or outpatient treatment planned    ( ) * Airflow rates greater than or equal to 60% of baseline or predicted    ( ) * Tachypnea absent    1/21/2022 09:54:06 EST by Angella Brown      resp 19 14 18 24 14 21 21 20 23 21    ( ) * Hypoxemia absent    1/21/2022 09:54:06 EST by Angella Brown      2l nc baseline ra  cxr New mild areas of discoid atelectasis are noted at the right lung base.     ( ) * Breathing without retractions or accessory muscle use    ( ) * Beta-agonist treatment not needed or response sustained for at least 3 hours    ( ) * Discharge plans and education understood    Activity    ( ) * Ambulatory or acceptable for next level of care    Routes    (X) * Oral hydration    1/21/2022 09:54:06 EST by Angella Brown      ivf dcd    ( ) * Oral medications or regimen acceptable for next level of care    1/21/2022 09:54:06 EST by Angella Brown      iv solumedrol 80 mg q6h    (X) * Oral diet or acceptable for next level of care    1/21/2022 09:54:06 EST by Angella Brown      reg    Interventions    ( ) * Oxygen absent or at baseline    1/21/2022 09:54:06 EST by Angella Brown      2l nc    Medications    ( ) Convert to oral and inhaled medications    1/21/2022 09:54:06 EST by Angella Brown      atrovent nebs q4h    * Milestone   Additional Notes   1/20/21 LOC IP TELE       Per attending    Patient continues to be short of breath and tachycardic.  She says that the steroids are not doing very much   Symptoms can be exacerbated by GERD, GI consult was placed by pulmonary and they will do EGD tomorrow. Patient is requiring high doses of steroids.  Continue steroids for now but we must consider other causes of her breathing issues   Symptoms can be exacerbated by GERD, GI consult was placed by pulmonary and they will do EGD tomorrow. Patient is requiring high doses of steroids.  Continue steroids for now but we must consider other causes of her breathing issues      Per pulmonary    1. ECHO pending   2. Pending IgE   3. She has significant GERD which has not been explored in a while. Will as GI to see patient   4. If no therapy works query [address]? 5. Follow serologies   6. Bronchodilators and finds relief    7. Stop IV fluids    8. Chest image today   9.  O2 titration above 90%      Per gi    · PPI and H2 blocker   · GERD diet   · NPO midnight   · Discussed with SUSANNE Huggins with pulmonary - okay for EGD   · Plan for EGD tomorrow        Asthma - Care Day 2 (1/19/2022) by Lucio Morataya RN       Review Status Review Entered   Completed 1/21/2022 09:46      Criteria Review      Care Day: 2 Care Date: 1/19/2022 Level of Care: Telemetry    Guideline Day 2    Level Of Care    ( ) Floor to discharge    1/21/2022 09:46:54 EST by Krish Kwan      1/19 tele    Clinical Status    ( ) * No requirement for assisted ventilation    ( ) * Hemodynamic stability    1/21/2022 09:46:54 EST by Krish Kwan      98.4 115 138/97 98% 3l nc   hr 96 111 135 115    ( ) * Infection absent or outpatient treatment planned    1/21/2022 09:46:54 EST by Krish Kwan      wbc 17.1 gluc 145    ( ) * Airflow rates greater than or equal to 60% of baseline or predicted    ( ) * Tachypnea absent    ( ) * Hypoxemia absent    1/21/2022 09:46:54 EST by Krish Kwan      baseline ra    ( ) * Breathing without retractions or accessory muscle use    ( ) * Beta-agonist treatment not needed or response sustained for at least 3 hours    ( ) * Discharge plans and education understood    Activity    ( ) * Ambulatory or acceptable for next level of care    1/21/2022 09:46:54 EST by Shaun Owusu      cardiac monitorng   amb w assistance   scds    Routes    ( ) * Oral hydration    1/21/2022 09:46:54 EST by Lily Ingram ivf @ 75    ( ) * Oral medications or regimen acceptable for next level of care    1/21/2022 09:46:54 EST by Shaun Owusu      iv zithromax 500mg q24h  iv solumedrol changed to 80 mg q6h    ( ) * Oral diet or acceptable for next level of care    1/21/2022 09:46:54 EST by Shaun Owusu      reg    Interventions    ( ) * Oxygen absent or at baseline    1/21/2022 09:46:54 EST by Shaun Owusu      3l nc    Medications    ( ) Convert to oral and inhaled medications    1/21/2022 09:46:54 EST by Emani Paul nebs 2x daily   pulmiocrt nebs 2x daily   astelin 1 spray 2 xdaily both nostril  atrovent nebs q4h    * Milestone   Additional Notes   1/19 tele       Per attending    She is mildly tachypneic.  No audible wheezing.  Tachycardic on the monitor in the 130s. Acute asthmatic exacerbation.  She failed outpatient treatment.  Has been having persistent symptoms since Thanksgiving.   --CTA of the lung on admission negative for PE, showed bandlike foci of atelectasis/scarring in the lung bases and trace pericardial effusion.  Respiratory viral panel including SARS-CoV-2 PCR negative.  Continue IV steroids, inhaled steroids/LABA, scheduled and as needed bronchodilators.  On empiric antibiotics with Zithromax. -- Pulmonary input appreciated.       Sinus tachycardia related to asthmatic exacerbation: Patient has had 10 tachycardia during flareups that slowly improve while in the acute attacks resolved.    -- CTA lung showed trace pericardial effusion, will go ahead and obtain echocardiogram   -- Patient currently undergoing cardiac evaluation at [facility] to look into nonpulmonary causes for her respiratory symptoms       Leukocytosis, this is most probably due to steroid induced, patient not septic.  Monitor   GERD: Patient uses both pantoprazole and famotidine. Per pulmonary   · Continue IV steroids   · DDX- for severe steroid dependent asthma- ? ABPA. · Concerns over tachycardia- suggest xopenex nebs scheduled   · Scheduled pulmicort BID   · Pt follows with Dr. Jagruti Humphrey.    · Got first shot of TSLP inhibitor ( last week from allergist)

## 2022-01-21 NOTE — PERIOP NOTES
TRANSFER - IN REPORT:    Verbal report received from Mike Arevalo RN on Corlis Scales  being received from [phone] for ordered procedure      Report consisted of patients Situation, Background, Assessment and   Recommendations(SBAR). Information from the following report(s) SBAR, Cardiac Rhythm ST and Pre Procedure Checklist was reviewed with the receiving nurse. Opportunity for questions and clarification was provided. Assessment completed upon patients arrival to unit and care assumed. TRANSFER - OUT REPORT:    Verbal report given to Mike Arevalo RN on Corlis Scales  being transferred to [phone] for routine progression of care       Report consisted of patients Situation, Background, Assessment and   Recommendations(SBAR). Information from the following report(s) SBAR, Procedure Summary, Cardiac Rhythm SR and Quality Measures was reviewed with the receiving nurse. Lines:   Peripheral IV 01/18/22 Left Antecubital (Active)   Site Assessment Clean, dry, & intact 01/20/22 2130   Phlebitis Assessment 0 01/20/22 2130   Infiltration Assessment 0 01/20/22 2130   Dressing Status Clean, dry, & intact 01/20/22 2130   Dressing Type Transparent 01/20/22 2130   Hub Color/Line Status Pink 01/20/22 2130   Action Taken Open ports on tubing capped 01/20/22 0004   Alcohol Cap Used Yes 01/20/22 0004        Opportunity for questions and clarification was provided.       Patient transported with:   Ericka Bliss IV

## 2022-01-21 NOTE — ANESTHESIA PREPROCEDURE EVALUATION
Relevant Problems   No relevant active problems       Anesthetic History   No history of anesthetic complications            Review of Systems / Medical History  Patient summary reviewed, nursing notes reviewed and pertinent labs reviewed    Pulmonary            Asthma : poorly controlled       Neuro/Psych   Within defined limits           Cardiovascular                  Exercise tolerance: >4 METS     GI/Hepatic/Renal     GERD           Endo/Other             Other Findings   Comments: Hoarse  Last prednisone 80mg at 8am  Albuterol   Cheeks swollen and tender  Lungs CTA         Physical Exam    Airway  Mallampati: III    Neck ROM: normal range of motion   Mouth opening: Normal     Cardiovascular    Rhythm: regular           Dental  No notable dental hx       Pulmonary  Breath sounds clear to auscultation               Abdominal         Other Findings            Anesthetic Plan    ASA: 3  Anesthesia type: MAC          Induction: Intravenous  Anesthetic plan and risks discussed with: Patient

## 2022-01-21 NOTE — PROGRESS NOTES
Pulmonary, Critical Care, and Sleep Medicine~Progress Note    Name: Sanjuanita Casas MRN: [id number]   : 1985 Hospital: . [address]   Date: 2022 11:58 AM Admission: 2022     Impression Plan   1. AE of asthma. Followed by Dr Salvador Sterling. Previous IgE was around 319. On 20. On prednisone as an outpatient. Unclear how much   2. significant GERD, has seen GI before and has had minimal relief. Likely worsening her asthma  3. Tachycardia, per hospitalist   4. Allergies. On Trezsp[place] injections with Dr Hayes Huff. 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 5. 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 Pending IgE  2. Reduce steroids as this might be causing her problem more than helping at this point   3. She has significant GERD which has not been explored in a while. Will as GI to see patient; if nothing can be done she will require a new patient visit with Dr Taniya Johnson, GERD specialist.   4. Noted outpatient work up for [address]; this should be explored again as an outpatient. 5. Follow serologies  6. Bronchodilators and finds relief   7. O2 titration above 90%  8. Would suggest low dose benzos  9. Work up could be completed as an outpatient with Dr Salvador Sterling. 10. Discussed with hospitalist again today  11. PRN if she stays over the weekend      Daily Progression:    Tachy at rest is improved   Feels the same  No bronchospasms noted  Notable hoarseness     RF < 10  22  Result status: Final result       Left Ventricle: Left ventricle size is normal. Normal wall thickness. Normal wall motion. Normal left ventricular systolic function with a visually estimated EF of 60 - 65%. Pericardium No pericardial effusion      chest x-ray:   IMPRESSION  New mild areas of discoid atelectasis are noted at the right lung base. I have reviewed the labs and previous days notes. Pertinent items are noted in HPI.     OBJECTIVE:     Vital Signs: Visit Vitals  BP (!) 131/91   Pulse (!) 115   Temp 98.2 °F (36.8 °C)   Resp 20   Ht 5' (1.524 m)   Wt 64.4 kg (142 lb)   SpO2 100%   Breastfeeding No   BMI 27.73 kg/m²      Temp (24hrs), Av.6 °F (37 °C), Min:98.2 °F (36.8 °C), Max:99.3 °F (37.4 °C)     Intake/Output:     Last shift: No intake/output data recorded.     Last 3 shifts:  1901 -  0700  In: 1825 [I.V.:1825]  Out: -         No intake or output data in the 24 hours ending 22 1052    Physical Exam:                                        Exam Findings Other   General: No resp distress noted, appears stated age    [de-identified]:  No ulcers, JVD not elevated, no cervical LAD    Chest: No pectus deformity, normal chest rise b/l    HEART:  RRR, mild tachy    Lungs:  Clear lung fields     ABD: Soft/NT, non rigid mildly distended    EXT: No cyanosis/clubbing/edema, normal peripheral pulses    Skin: No rashes or ulcers, no mottling    Neuro: A/O x 3        Medications:  Current Facility-Administered Medications   Medication Dose Route Frequency    0.9% sodium chloride infusion  50 mL/hr IntraVENous CONTINUOUS    sodium chloride (NS) flush 5-40 mL  5-40 mL IntraVENous Q8H    sodium chloride (NS) flush 5-40 mL  5-40 mL IntraVENous PRN    midazolam (VERSED) injection 0.25-5 mg  0.25-5 mg IntraVENous Multiple    fentaNYL citrate (PF) injection  mcg   mcg IntraVENous Multiple    naloxone (NARCAN) injection 0.4 mg  0.4 mg IntraVENous Multiple    flumazeniL (ROMAZICON) 0.1 mg/mL injection 0.2 mg  0.2 mg IntraVENous Multiple    simethicone (MYLICON) 50NN/4.8GG oral drops 80 mg  1.2 mL Oral Multiple    atropine injection 0.5 mg  0.5 mg IntraVENous ONCE PRN    EPINEPHrine (ADRENALIN) 0.1 mg/mL syringe 1 mg  1 mg Endoscopically ONCE PRN    melatonin tablet 3 mg  3 mg Oral QHS    ipratropium (ATROVENT) 0.02 % nebulizer solution 0.5 mg  0.5 mg Nebulization Q4H RT    methylPREDNISolone (PF) (Solu-MEDROL) injection 80 mg  80 mg IntraVENous Q6H    calcium carbonate (TUMS) chewable tablet 200 mg [elemental]  200 mg Oral TID WITH MEALS    famotidine (PEPCID) tablet 10 mg  10 mg Oral DAILY    arformoteroL (BROVANA) neb solution 15 mcg  15 mcg Nebulization BID RT    And    budesonide (PULMICORT) 500 mcg/2 ml nebulizer suspension  500 mcg Nebulization BID RT    cetirizine-pseudoePHEDrine (ZyrTEC-D) 5-120 mg per tablet 1 Tablet (Patient Supplied)  1 Tablet Oral BID PRN    sodium chloride (NS) flush 5-40 mL  5-40 mL IntraVENous Q8H    sodium chloride (NS) flush 5-40 mL  5-40 mL IntraVENous PRN    azithromycin (ZITHROMAX) 500 mg in 0.9% sodium chloride 250 mL (VIAL-MATE)  500 mg IntraVENous Q24H    acetaminophen (TYLENOL) tablet 650 mg  650 mg Oral Q4H PRN    ondansetron (ZOFRAN) injection 4 mg  4 mg IntraVENous Q4H PRN    montelukast (SINGULAIR) tablet 10 mg  10 mg Oral QHS    albuterol-ipratropium (DUO-NEB) 2.5 MG-0.5 MG/3 ML  3 mL Nebulization Q6H PRN    hydrOXYzine HCL (ATARAX) tablet 50 mg  50 mg Oral TID PRN    fluticasone propionate (FLONASE) 50 mcg/actuation nasal spray 2 Spray  2 Spray Both Nostrils DAILY    azelastine (ASTELIN) 137mcg/spray nasal spray  1 Spray Both Nostrils BID    pantoprazole (PROTONIX) tablet 40 mg  40 mg Oral BID       Labs:  ABG Recent Labs     01/18/22 2024   PHI 7.39   PCO2I 33.2*   PO2I 87   HCO3I 20.2*   SO2I 96.6        CBC Recent Labs     01/21/22 0429 01/19/22  0250   WBC 18.5* 17.1*   HGB 12.2 13.0   HCT 38.3 40.3    305   MCV 88.7 88.2   MCH 28.2 53.0        Metabolic  Panel Recent Labs     01/21/22 0429 01/19/22  0250    137   K 4.1 3.7    106   CO2 26 21   * 145*   BUN 18 14   CREA 0.78 0.93   CA 8.9 8.8        Pertinent Labs                Daniel Venegas PA-C  1/21/2022

## 2022-01-21 NOTE — PROCEDURES
[address] [facility] (EGD) Procedure Note    Shamar Sender  1985  [id number]      Procedure: Endoscopic Gastroduodenoscopy --diagnostic, with biopsy    Indication:  GERD     Pre-operative Diagnosis: see indication above    Post-operative Diagnosis: see findings below    : Rishi Jackson MD    Staff: Endoscopy Technician-1: Tess Juarez  Endoscopy RN-1: Violeta Aguirre RN     Referring Provider:  Valerio Shafer NP      Anesthesia/Sedation:  MAC anesthesia Propofol        Procedure Details     After informed consent was obtained for the procedure, with all risks and benefits of procedure explained the patient was taken to the endoscopy suite and placed in the left lateral decubitus position. Following sequential administration of sedation as per above, the endoscope was inserted into the mouth and advanced under direct vision to second portion of the duodenum. A careful inspection was made as the gastroscope was withdrawn, including a retroflexed view of the proximal stomach; findings and interventions are described below. Findings:   Esophagus:normal - cold biopsies taken  Stomach: normal - cold biopsies taken  Duodenum: normal - cold biopsies taken      Therapies: As above    Specimens: 1. Duodenum, 2. Gastric, 3. Esophagus         EBL: None      Complications:   None; patient tolerated the procedure well. Impression:    As above    Recommendations:  1. Follow up surgical pathology  2. Continue PPI BID  3. Continue acid reflux precautions  4. Regular diet  5. GI follow up in 1-2 months. Will sign off for now. Please call with any questions    Signed By: Rishi Jackson MD     1/21/2022  11:25 AM

## 2022-01-21 NOTE — PROGRESS NOTES
Problem: Falls - Risk of  Goal: *Absence of Falls  Description: Document Rgeis Oris Fall Risk and appropriate interventions in the flowsheet.   Outcome: Progressing Towards Goal  Note: Fall Risk Interventions:            Medication Interventions: Assess postural VS orthostatic hypotension,Evaluate medications/consider consulting pharmacy,Patient to call before getting OOB

## 2022-01-21 NOTE — PROGRESS NOTES
Bedside shift change report given to Alexandra Cardenas (oncoming nurse) by Jordana Mcclellan RN (offgoing nurse). Report included the following information SBAR and Kardex.

## 2022-01-22 LAB
ATRIAL RATE: 133 BPM
ATRIAL RATE: 228 BPM
CALCULATED P AXIS, ECG09: 41 DEGREES
CALCULATED P AXIS, ECG09: 49 DEGREES
CALCULATED R AXIS, ECG10: 54 DEGREES
CALCULATED R AXIS, ECG10: 55 DEGREES
CALCULATED T AXIS, ECG11: 24 DEGREES
CALCULATED T AXIS, ECG11: 40 DEGREES
DIAGNOSIS, 93000: NORMAL
DIAGNOSIS, 93000: NORMAL
IGE SERPL-ACNC: 130 IU/ML (ref 6–495)
P-R INTERVAL, ECG05: 126 MS
Q-T INTERVAL, ECG07: 284 MS
Q-T INTERVAL, ECG07: 304 MS
QRS DURATION, ECG06: 74 MS
QRS DURATION, ECG06: 74 MS
QTC CALCULATION (BEZET), ECG08: 419 MS
QTC CALCULATION (BEZET), ECG08: 422 MS
VENTRICULAR RATE, ECG03: 114 BPM
VENTRICULAR RATE, ECG03: 133 BPM

## 2022-01-22 PROCEDURE — 74011000250 HC RX REV CODE- 250: Performed by: INTERNAL MEDICINE

## 2022-01-22 PROCEDURE — 74011250637 HC RX REV CODE- 250/637: Performed by: FAMILY MEDICINE

## 2022-01-22 PROCEDURE — 94640 AIRWAY INHALATION TREATMENT: CPT

## 2022-01-22 PROCEDURE — 74011250637 HC RX REV CODE- 250/637: Performed by: NURSE PRACTITIONER

## 2022-01-22 PROCEDURE — 94760 N-INVAS EAR/PLS OXIMETRY 1: CPT

## 2022-01-22 PROCEDURE — 74011250636 HC RX REV CODE- 250/636: Performed by: FAMILY MEDICINE

## 2022-01-22 PROCEDURE — 74011250637 HC RX REV CODE- 250/637: Performed by: INTERNAL MEDICINE

## 2022-01-22 PROCEDURE — 74011000250 HC RX REV CODE- 250: Performed by: FAMILY MEDICINE

## 2022-01-22 PROCEDURE — 74011250637 HC RX REV CODE- 250/637: Performed by: HOSPITALIST

## 2022-01-22 PROCEDURE — 65660000000 HC RM CCU STEPDOWN

## 2022-01-22 PROCEDURE — 74011000250 HC RX REV CODE- 250: Performed by: HOSPITALIST

## 2022-01-22 PROCEDURE — 74011636637 HC RX REV CODE- 636/637: Performed by: HOSPITALIST

## 2022-01-22 RX ORDER — IPRATROPIUM BROMIDE 0.5 MG/2.5ML
0.5 SOLUTION RESPIRATORY (INHALATION)
Status: DISCONTINUED | OUTPATIENT
Start: 2022-01-22 | End: 2022-01-24 | Stop reason: HOSPADM

## 2022-01-22 RX ORDER — METOPROLOL TARTRATE 25 MG/1
12.5 TABLET, FILM COATED ORAL 2 TIMES DAILY
Status: DISCONTINUED | OUTPATIENT
Start: 2022-01-22 | End: 2022-01-22

## 2022-01-22 RX ORDER — METOPROLOL TARTRATE 25 MG/1
12.5 TABLET, FILM COATED ORAL ONCE
Status: COMPLETED | OUTPATIENT
Start: 2022-01-22 | End: 2022-01-22

## 2022-01-22 RX ORDER — METOPROLOL TARTRATE 25 MG/1
25 TABLET, FILM COATED ORAL 2 TIMES DAILY
Status: DISCONTINUED | OUTPATIENT
Start: 2022-01-23 | End: 2022-01-24 | Stop reason: HOSPADM

## 2022-01-22 RX ORDER — HYDROXYZINE 25 MG/1
25-50 TABLET, FILM COATED ORAL
Status: DISCONTINUED | OUTPATIENT
Start: 2022-01-22 | End: 2022-01-24 | Stop reason: HOSPADM

## 2022-01-22 RX ADMIN — PANTOPRAZOLE SODIUM 40 MG: 40 TABLET, DELAYED RELEASE ORAL at 09:07

## 2022-01-22 RX ADMIN — CALCIUM CARBONATE (ANTACID) CHEW TAB 500 MG 200 MG: 500 CHEW TAB at 07:13

## 2022-01-22 RX ADMIN — PREDNISONE 40 MG: 20 TABLET ORAL at 07:13

## 2022-01-22 RX ADMIN — SODIUM CHLORIDE, PRESERVATIVE FREE 10 ML: 5 INJECTION INTRAVENOUS at 07:13

## 2022-01-22 RX ADMIN — FAMOTIDINE 10 MG: 20 TABLET ORAL at 09:07

## 2022-01-22 RX ADMIN — METOPROLOL TARTRATE 12.5 MG: 25 TABLET, FILM COATED ORAL at 13:44

## 2022-01-22 RX ADMIN — HYDROXYZINE HYDROCHLORIDE 25 MG: 25 TABLET, FILM COATED ORAL at 19:49

## 2022-01-22 RX ADMIN — BUDESONIDE 500 MCG: 0.5 INHALANT RESPIRATORY (INHALATION) at 09:13

## 2022-01-22 RX ADMIN — ALPRAZOLAM 0.25 MG: 0.25 TABLET ORAL at 22:43

## 2022-01-22 RX ADMIN — BUDESONIDE 500 MCG: 0.5 INHALANT RESPIRATORY (INHALATION) at 19:14

## 2022-01-22 RX ADMIN — CALCIUM CARBONATE (ANTACID) CHEW TAB 500 MG 200 MG: 500 CHEW TAB at 18:15

## 2022-01-22 RX ADMIN — PANTOPRAZOLE SODIUM 40 MG: 40 TABLET, DELAYED RELEASE ORAL at 18:15

## 2022-01-22 RX ADMIN — IPRATROPIUM BROMIDE 0.5 MG: 0.5 SOLUTION RESPIRATORY (INHALATION) at 19:14

## 2022-01-22 RX ADMIN — AZITHROMYCIN DIHYDRATE 500 MG: 500 INJECTION, POWDER, LYOPHILIZED, FOR SOLUTION INTRAVENOUS at 18:14

## 2022-01-22 RX ADMIN — CALCIUM CARBONATE (ANTACID) CHEW TAB 500 MG 200 MG: 500 CHEW TAB at 13:44

## 2022-01-22 RX ADMIN — SODIUM CHLORIDE, PRESERVATIVE FREE 10 ML: 5 INJECTION INTRAVENOUS at 13:45

## 2022-01-22 RX ADMIN — METOPROLOL TARTRATE 12.5 MG: 25 TABLET, FILM COATED ORAL at 18:15

## 2022-01-22 RX ADMIN — SODIUM CHLORIDE, PRESERVATIVE FREE 10 ML: 5 INJECTION INTRAVENOUS at 22:43

## 2022-01-22 RX ADMIN — Medication 3 MG: at 22:43

## 2022-01-22 RX ADMIN — MONTELUKAST 10 MG: 10 TABLET, FILM COATED ORAL at 22:43

## 2022-01-22 RX ADMIN — FLUTICASONE PROPIONATE 2 SPRAY: 50 SPRAY, METERED NASAL at 09:00

## 2022-01-22 RX ADMIN — IPRATROPIUM BROMIDE 0.5 MG: 0.5 SOLUTION RESPIRATORY (INHALATION) at 09:13

## 2022-01-22 RX ADMIN — CETIRIZINE HYDROCHLORIDE, PSEUDOEPHEDRINE HYDROCHLORIDE 1 TABLET: 5; 120 TABLET, FILM COATED, EXTENDED RELEASE ORAL at 11:31

## 2022-01-22 RX ADMIN — ARFORMOTEROL TARTRATE 15 MCG: 15 SOLUTION RESPIRATORY (INHALATION) at 19:14

## 2022-01-22 RX ADMIN — METOPROLOL TARTRATE 12.5 MG: 25 TABLET, FILM COATED ORAL at 19:49

## 2022-01-22 RX ADMIN — ARFORMOTEROL TARTRATE 15 MCG: 15 SOLUTION RESPIRATORY (INHALATION) at 09:13

## 2022-01-22 RX ADMIN — AZELASTINE HYDROCHLORIDE 1 SPRAY: 137 SPRAY, METERED NASAL at 11:31

## 2022-01-22 RX ADMIN — IPRATROPIUM BROMIDE 0.5 MG: 0.5 SOLUTION RESPIRATORY (INHALATION) at 13:56

## 2022-01-22 NOTE — PROGRESS NOTES
Upon receiving report at around 2000, got a call that patient was tachy in the 160s. Went into patient's room and she was in the bathroom, saying she didn't feel well, was nauseous and dizzy. Assisted pt back to bed and got vitals which were normal except elevated heart rate. Pt was c/o chest pain to center of chest. Called rapid response. EKG showed sinus tach. D dimer and troponin wnl. Notified MD of results. MD ordered 2.5 mg of roxicodone for pain. After that, melatonin and xanax patient was comfortable resting with eyes closed and stated she felt better when waking. Heart rate dropped to 80s and 90s.

## 2022-01-23 PROCEDURE — 74011250637 HC RX REV CODE- 250/637: Performed by: HOSPITALIST

## 2022-01-23 PROCEDURE — 74011000250 HC RX REV CODE- 250: Performed by: HOSPITALIST

## 2022-01-23 PROCEDURE — 74011250637 HC RX REV CODE- 250/637: Performed by: NURSE PRACTITIONER

## 2022-01-23 PROCEDURE — 74011250637 HC RX REV CODE- 250/637: Performed by: INTERNAL MEDICINE

## 2022-01-23 PROCEDURE — 65660000000 HC RM CCU STEPDOWN

## 2022-01-23 PROCEDURE — 74011000250 HC RX REV CODE- 250: Performed by: FAMILY MEDICINE

## 2022-01-23 PROCEDURE — 74011000250 HC RX REV CODE- 250: Performed by: INTERNAL MEDICINE

## 2022-01-23 PROCEDURE — 94640 AIRWAY INHALATION TREATMENT: CPT

## 2022-01-23 PROCEDURE — 74011636637 HC RX REV CODE- 636/637: Performed by: HOSPITALIST

## 2022-01-23 PROCEDURE — 74011250637 HC RX REV CODE- 250/637: Performed by: FAMILY MEDICINE

## 2022-01-23 RX ADMIN — CALCIUM CARBONATE (ANTACID) CHEW TAB 500 MG 200 MG: 500 CHEW TAB at 12:19

## 2022-01-23 RX ADMIN — ARFORMOTEROL TARTRATE 15 MCG: 15 SOLUTION RESPIRATORY (INHALATION) at 19:21

## 2022-01-23 RX ADMIN — SODIUM CHLORIDE, PRESERVATIVE FREE 10 ML: 5 INJECTION INTRAVENOUS at 14:07

## 2022-01-23 RX ADMIN — PREDNISONE 40 MG: 20 TABLET ORAL at 09:49

## 2022-01-23 RX ADMIN — BUDESONIDE 500 MCG: 0.5 INHALANT RESPIRATORY (INHALATION) at 09:47

## 2022-01-23 RX ADMIN — CETIRIZINE HYDROCHLORIDE, PSEUDOEPHEDRINE HYDROCHLORIDE 1 TABLET: 5; 120 TABLET, FILM COATED, EXTENDED RELEASE ORAL at 09:55

## 2022-01-23 RX ADMIN — AZELASTINE HYDROCHLORIDE 1 SPRAY: 137 SPRAY, METERED NASAL at 18:02

## 2022-01-23 RX ADMIN — Medication 3 MG: at 21:06

## 2022-01-23 RX ADMIN — BUDESONIDE 500 MCG: 0.5 INHALANT RESPIRATORY (INHALATION) at 19:21

## 2022-01-23 RX ADMIN — IPRATROPIUM BROMIDE 0.5 MG: 0.5 SOLUTION RESPIRATORY (INHALATION) at 19:21

## 2022-01-23 RX ADMIN — IPRATROPIUM BROMIDE 0.5 MG: 0.5 SOLUTION RESPIRATORY (INHALATION) at 15:41

## 2022-01-23 RX ADMIN — MONTELUKAST 10 MG: 10 TABLET, FILM COATED ORAL at 21:06

## 2022-01-23 RX ADMIN — PANTOPRAZOLE SODIUM 40 MG: 40 TABLET, DELAYED RELEASE ORAL at 09:49

## 2022-01-23 RX ADMIN — CALCIUM CARBONATE (ANTACID) CHEW TAB 500 MG 200 MG: 500 CHEW TAB at 18:01

## 2022-01-23 RX ADMIN — ARFORMOTEROL TARTRATE 15 MCG: 15 SOLUTION RESPIRATORY (INHALATION) at 09:47

## 2022-01-23 RX ADMIN — PANTOPRAZOLE SODIUM 40 MG: 40 TABLET, DELAYED RELEASE ORAL at 18:01

## 2022-01-23 RX ADMIN — IPRATROPIUM BROMIDE 0.5 MG: 0.5 SOLUTION RESPIRATORY (INHALATION) at 09:47

## 2022-01-23 RX ADMIN — METOPROLOL TARTRATE 25 MG: 25 TABLET, FILM COATED ORAL at 18:01

## 2022-01-23 RX ADMIN — METOPROLOL TARTRATE 25 MG: 25 TABLET, FILM COATED ORAL at 09:49

## 2022-01-23 RX ADMIN — FAMOTIDINE 10 MG: 20 TABLET ORAL at 09:49

## 2022-01-23 RX ADMIN — CALCIUM CARBONATE (ANTACID) CHEW TAB 500 MG 200 MG: 500 CHEW TAB at 09:49

## 2022-01-23 RX ADMIN — SODIUM CHLORIDE, PRESERVATIVE FREE 10 ML: 5 INJECTION INTRAVENOUS at 06:02

## 2022-01-23 RX ADMIN — AZELASTINE HYDROCHLORIDE 1 SPRAY: 137 SPRAY, METERED NASAL at 09:55

## 2022-01-23 RX ADMIN — ALPRAZOLAM 0.25 MG: 0.25 TABLET ORAL at 21:06

## 2022-01-23 NOTE — PROGRESS NOTES
[facility] [facility]  Hospitalist Group                                                                                          Hospitalist Progress Note  Luis Reich MD  Answering service: [phone] or [phone] from in house phone        Date of Service:  2022  NAME:  Yudi Huynh  :  1985  MRN:  [id number]    This documentation was facilitated by a Voice Recognition software and may contain inadvertent typographical errors. 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 She never had childhood asthma. She has been suffering from frequent flareups requiring hospitalization, urgent care and PCP visit and receiving steroid use on top of her other respiratory therapeutics. She has been having respiratory difficulty and tachycardia since Thanks[place] with no relief despite high-dose steroid, bronchodilators inhaled steroids and allergy medications so she came to the ER on 2022. She is currently undergoing cardiac evaluation at [facility] referred by her pulmonologist for cardiac causes of her respiratory symptoms and tachycardia. Patient denied history of autoimmune disorders such as SLE, RA etc.    Interval history / Subjective:        Patient seen and examined, her  at the bedside. Still with significant exertional shortness of breath and tachycardia. Uses hydroxyzine for itching. Okay for using it for anxiety. D/W them about trial of Lopressor, with caution especially in light of asthma exacerbation. Medication action and side effect profile discussed at length with patient and family. Willing to give it a try in a monitored setting right now. Overnight events noted. No other concerns or overnight events otherwise.     Assessment & Plan:   Acute asthmatic exacerbation. She failed outpatient treatment. Has been having persistent symptoms since Thanksgiving.  --CTA of the lung on admission negative for PE, showed bandlike foci of atelectasis/scarring in the lung bases and trace pericardial effusion. Respiratory viral panel including SARS-CoV-2 PCR negative. Change IV Solu-Medrol to prednisone, continue inhaled steroid/LABA  --Wean prednisone with slow taper at home and further per PCP/pulmonology follow-up. Discussed at length. --Wean O2 tolerated. Currently on 2 LPM O2 NC. Does not use any oxygen at home.  --EGD showed normal esophagus, stomach and duodenum, biopsies taken. --On PPI and H2 blockers at home.  --GI follow-up with Dr. Ha Rangel in 1-2 months outpatient  -- Hydroxyzine 25-50 mg 3 times daily as needed for anxiety. Currently patient has 50 mg 3 times daily as needed for itching as a home medication  -- Low-dose of Xanax as needed as well available  -- Would need outpatient follow-up with pulmonologist, PCP and likely Dr. Tara Donovan, GERD specialist as suggested by pulmonology here    Sinus tachycardia related to asthmatic exacerbation: Patient has had 10 tachycardia during flareups that slowly improve while in the acute attacks resolved. -- CTA lung showed trace pericardial effusion, will go ahead and obtain echocardiogram  -- Patient currently undergoing cardiac evaluation at [facility] to look into nonpulmonary causes for her respiratory symptoms  Echocardiogram did not show anything new, no pericardial effusion. -- Lopressor 12.5 mg twice daily trial.  Discussed at length with patient and family especially in the light of asthma and beta-blocker use with caution discussed at length. -- Patient is agreeable. We will adjust as tolerated by patient in light of heart rate, blood pressure, respiratory status    Leukocytosis, this is most probably due to steroid induced, patient not septic.   Monitor    GERD: Patient uses both pantoprazole and famotidine. Care Plan discussed with: Patient  Code status: Full code  DVT prophylaxis: Lovenox  Anticipated Disposition: Expected home in 24-48 hours. Discharge care plan discussed with patient and her  at the bedside. Hospital Problems  Date Reviewed: 12/8/2021          Codes Class Noted POA    SOB (shortness of breath) ICD-10-CM: R06.02  ICD-9-CM: 786.05  1/18/2022 Unknown                Review of Systems:   A comprehensive review of systems was negative except for that written in the HPI. Vital Signs:    Last 24hrs VS reviewed since prior progress note. Most recent are:      No intake or output data in the 24 hours ending 01/22/22 1913     Physical Examination:     I had a face to face encounter with this patient and independently examined them on1/22/2022 as outlined below:        Constitutional:  Alert     HEENT:  Atraumatic. Oral mucosa moist,. Non icteric sclera. No pallor. Resp:  On room air, clear air entry bilaterally without wheezing or rhonchi or any other added sounds. Not using any accessory muscles at present   Chest Wall: No deformity     CV:  Regular rhythm, tachycardia +, no murmurs, gallops, rubs      GI:  Normoactive  Soft, non distended, non tender. No appreciable organomegaly      :  No CVA or suprapubic tenderness      Musculoskeletal:  No edema  No deformity  ROM intact      Neurologic:  Mental status:AAOx3,   Cranial nerves II-XII : WNL  Motor exam:Moves all extremities symmetrically                Data Review:    Review and/or order of clinical lab test  Review and/or order of tests in the radiology section of CPT  Review and/or order of tests in the medicine section of CPT      Available Lab and Imaging results reviewed and used to formulate the current care plan. CTA CHEST W OR W WO CONT    Result Date: 1/18/2022  1. No pulmonary embolism nor other definite acute finding in the chest. 2.  Nonspecific trace pericardial effusion.  3.  Multiple indeterminate hypodense lesions in the liver not definitely seen on prior exam. Nonemergent liver mass protocol MRI can be obtained as an outpatient for further characterization    XR CHEST PORT    Result Date: 1/20/2022  New mild areas of discoid atelectasis are noted at the right lung base.     Recent Results (from the past 24 hour(s))   D DIMER    Collection Time: 01/21/22  8:29 PM   Result Value Ref Range    D-dimer 0.32 0.00 - 0.65 mg/L FEU   TROPONIN-HIGH SENSITIVITY    Collection Time: 01/21/22  8:29 PM   Result Value Ref Range    Troponin-High Sensitivity 19 0 - 51 ng/L   EKG, 12 LEAD, INITIAL    Collection Time: 01/21/22  8:29 PM   Result Value Ref Range    Ventricular Rate 133 BPM    Atrial Rate 133 BPM    P-R Interval 126 ms    QRS Duration 74 ms    Q-T Interval 284 ms    QTC Calculation (Bezet) 422 ms    Calculated P Axis 41 degrees    Calculated R Axis 54 degrees    Calculated T Axis 24 degrees    Diagnosis       Sinus tachycardia  When compared with ECG of 21-JAN-2022 18:03,  Sinus rhythm present  Confirmed by Jimmy Calderon MD, Cecilia Maxwell ([id number]) on 1/22/2022 1:36:26 PM           Medications Reviewed:     Current Facility-Administered Medications   Medication Dose Route Frequency    metoprolol tartrate (LOPRESSOR) tablet 12.5 mg  12.5 mg Oral BID    hydrOXYzine HCL (ATARAX) tablet 25-50 mg  25-50 mg Oral TID PRN    ipratropium (ATROVENT) 0.02 % nebulizer solution 0.5 mg  0.5 mg Nebulization Q6H RT    sodium chloride (NS) flush 5-40 mL  5-40 mL IntraVENous Q8H    sodium chloride (NS) flush 5-40 mL  5-40 mL IntraVENous PRN    predniSONE (DELTASONE) tablet 40 mg  40 mg Oral DAILY WITH BREAKFAST    ALPRAZolam (XANAX) tablet 0.25 mg  0.25 mg Oral TID PRN    melatonin tablet 3 mg  3 mg Oral QHS    calcium carbonate (TUMS) chewable tablet 200 mg [elemental]  200 mg Oral TID WITH MEALS    famotidine (PEPCID) tablet 10 mg  10 mg Oral DAILY    arformoteroL (BROVANA) neb solution 15 mcg  15 mcg Nebulization BID RT And    budesonide (PULMICORT) 500 mcg/2 ml nebulizer suspension  500 mcg Nebulization BID RT    cetirizine-pseudoePHEDrine (ZyrTEC-D) 5-120 mg per tablet 1 Tablet (Patient Supplied)  1 Tablet Oral BID PRN    sodium chloride (NS) flush 5-40 mL  5-40 mL IntraVENous Q8H    sodium chloride (NS) flush 5-40 mL  5-40 mL IntraVENous PRN    azithromycin (ZITHROMAX) 500 mg in 0.9% sodium chloride 250 mL (VIAL-MATE)  500 mg IntraVENous Q24H    acetaminophen (TYLENOL) tablet 650 mg  650 mg Oral Q4H PRN    ondansetron (ZOFRAN) injection 4 mg  4 mg IntraVENous Q4H PRN    montelukast (SINGULAIR) tablet 10 mg  10 mg Oral QHS    albuterol-ipratropium (DUO-NEB) 2.5 MG-0.5 MG/3 ML  3 mL Nebulization Q6H PRN    fluticasone propionate (FLONASE) 50 mcg/actuation nasal spray 2 Spray  2 Spray Both Nostrils DAILY    azelastine (ASTELIN) 137mcg/spray nasal spray  1 Spray Both Nostrils BID    pantoprazole (PROTONIX) tablet 40 mg  40 mg Oral BID     ______________________________________________________________________  EXPECTED LENGTH OF STAY: 3d 0h  ACTUAL LENGTH OF STAY:          4                 Tereza Issa MD

## 2022-01-23 NOTE — PROGRESS NOTES
[address] [facility] Adult  Hospitalist Group                                                                                          Hospitalist Progress Note  Rajat Mullen MD  Answering service: [phone] OR [phone] from in house phone        Date of Service:  2022  NAME:  Brenda Guadarrama  :  1985  MRN:  [id number]    This documentation was facilitated by a Voice Recognition software and may contain inadvertent typographical errors. 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 She never had childhood asthma. She has been suffering from frequent flareups requiring hospitalization, urgent care and PCP visit and receiving steroid use on top of her other respiratory therapeutics. She has been having respiratory difficulty and tachycardia since  with no relief despite high-dose steroid, bronchodilators inhaled steroids and allergy medications so she came to the ER on 2022. She is currently undergoing cardiac evaluation at [facility] referred by her pulmonologist for cardiac causes of her respiratory symptoms and tachycardia. Patient denied history of autoimmune disorders such as SLE, RA etc.    Interval history / Subjective:        Patient seen and examined for follow-up of tachycardia and possible asthma exacerbation. Patient seen and examined earlier today by me. Patient has had some improvement in tachycardia with Lopressor reports some improvement in symptoms. She does still continue to report shortness of breath. Assessment & Plan:   Acute asthmatic exacerbation. She failed outpatient treatment.   Has been having persistent symptoms since .  --CTA of the lung on admission negative for PE, showed bandlike foci of atelectasis/scarring in the lung bases and trace pericardial effusion. Respiratory viral panel including SARS-CoV-2 PCR negative. Change IV Solu-Medrol to prednisone, continue inhaled steroid/LABA  --Wean prednisone with slow taper at home and further per PCP/pulmonology follow-up. Discussed at length. --Wean O2 tolerated. Currently on 2 LPM O2 NC. Does not use any oxygen at home.  --EGD showed normal esophagus, stomach and duodenum, biopsies taken. --On PPI and H2 blockers at home.  --GI follow-up with Dr. Tenisha Rob in 1-2 months outpatient  -- Hydroxyzine 25-50 mg 3 times daily as needed for anxiety. Currently patient has 50 mg 3 times daily as needed for itching as a home medication  -- Low-dose of Xanax as needed as well available  -- Would need outpatient follow-up with pulmonologist, PCP and likely Dr. Lizbet Phillips, GERD specialist as suggested by pulmonology here    Sinus tachycardia related to asthmatic exacerbation: Patient has had 10 tachycardia during flareups that slowly improve while in the acute attacks resolved. -- CTA lung showed trace pericardial effusion, will go ahead and obtain echocardiogram  -- Patient [facility] look into nonpulmonary causes for her respiratory symptoms  Echocardiogram did not show anything new, no pericardial effusion. -- Lopressor now at 25 mg twice daily    Leukocytosis, this is most probably due to steroid induced, patient not septic. Monitor    GERD: Patient uses both pantoprazole and famotidine. Care Plan discussed with: Patient  Code status: Full code  DVT prophylaxis: Lovenox  Anticipated Disposition: Expected home in 24-48 hours. Discharge care plan discussed with patient and her  at the bedside.       Hospital Problems  Date Reviewed: 12/8/2021          Codes Class Noted POA    SOB (shortness of breath) ICD-10-CM: R06.02  ICD-9-CM: 786.05  1/18/2022 Unknown                Review of Systems:   A comprehensive review of systems was negative except for that written in the HPI. Vital Signs:    Last 24hrs VS reviewed since prior progress note. Most recent are:      No intake or output data in the 24 hours ending 01/23/22 1342     Physical Examination:     I had a face to face encounter with this patient and independently examined them on1/23/2022 as outlined below:        Constitutional:  Alert     HEENT:  Atraumatic. Oral mucosa moist,. Non icteric sclera. No pallor. Resp:  On room air, clear air entry bilaterally without wheezing or rhonchi or any other added sounds. Not using any accessory muscles at present   Chest Wall: No deformity     CV:  Regular rhythm, tachycardia +, no murmurs, gallops, rubs      GI:  Normoactive  Soft, non distended, non tender. No appreciable organomegaly      :  No CVA or suprapubic tenderness      Musculoskeletal:  No edema  No deformity  ROM intact      Neurologic:  Mental status:AAOx3,   Cranial nerves II-XII : WNL  Motor exam:Moves all extremities symmetrically                Data Review:    Review and/or order of clinical lab test  Review and/or order of tests in the radiology section of CPT  Review and/or order of tests in the medicine section of CPT      Available Lab and Imaging results reviewed and used to formulate the current care plan. CTA CHEST W OR W WO CONT    Result Date: 1/18/2022  1. No pulmonary embolism nor other definite acute finding in the chest. 2.  Nonspecific trace pericardial effusion. 3.  Multiple indeterminate hypodense lesions in the liver not definitely seen on prior exam. Nonemergent liver mass protocol MRI can be obtained as an outpatient for further characterization    XR CHEST PORT    Result Date: 1/20/2022  New mild areas of discoid atelectasis are noted at the right lung base. No results found for this or any previous visit (from the past 24 hour(s)).       Medications Reviewed:     Current Facility-Administered Medications   Medication Dose Route Frequency  hydrOXYzine HCL (ATARAX) tablet 25-50 mg  25-50 mg Oral TID PRN    ipratropium (ATROVENT) 0.02 % nebulizer solution 0.5 mg  0.5 mg Nebulization Q6H RT    metoprolol tartrate (LOPRESSOR) tablet 25 mg  25 mg Oral BID    sodium chloride (NS) flush 5-40 mL  5-40 mL IntraVENous Q8H    sodium chloride (NS) flush 5-40 mL  5-40 mL IntraVENous PRN    predniSONE (DELTASONE) tablet 40 mg  40 mg Oral DAILY WITH BREAKFAST    ALPRAZolam (XANAX) tablet 0.25 mg  0.25 mg Oral TID PRN    melatonin tablet 3 mg  3 mg Oral QHS    calcium carbonate (TUMS) chewable tablet 200 mg [elemental]  200 mg Oral TID WITH MEALS    famotidine (PEPCID) tablet 10 mg  10 mg Oral DAILY    arformoteroL (BROVANA) neb solution 15 mcg  15 mcg Nebulization BID RT    And    budesonide (PULMICORT) 500 mcg/2 ml nebulizer suspension  500 mcg Nebulization BID RT    cetirizine-pseudoePHEDrine (ZyrTEC-D) 5-120 mg per tablet 1 Tablet (Patient Supplied)  1 Tablet Oral BID PRN    sodium chloride (NS) flush 5-40 mL  5-40 mL IntraVENous Q8H    sodium chloride (NS) flush 5-40 mL  5-40 mL IntraVENous PRN    acetaminophen (TYLENOL) tablet 650 mg  650 mg Oral Q4H PRN    ondansetron (ZOFRAN) injection 4 mg  4 mg IntraVENous Q4H PRN    montelukast (SINGULAIR) tablet 10 mg  10 mg Oral QHS    albuterol-ipratropium (DUO-NEB) 2.5 MG-0.5 MG/3 ML  3 mL Nebulization Q6H PRN    fluticasone propionate (FLONASE) 50 mcg/actuation nasal spray 2 Spray  2 Spray Both Nostrils DAILY    azelastine (ASTELIN) 137mcg/spray nasal spray  1 Spray Both Nostrils BID    pantoprazole (PROTONIX) tablet 40 mg  40 mg Oral BID     ______________________________________________________________________  EXPECTED LENGTH OF STAY: 3d 0h  ACTUAL LENGTH OF STAY:          5                 Clemente Patel MD

## 2022-01-24 VITALS
BODY MASS INDEX: 27.88 KG/M2 | RESPIRATION RATE: 18 BRPM | HEIGHT: 60 IN | DIASTOLIC BLOOD PRESSURE: 71 MMHG | HEART RATE: 114 BPM | TEMPERATURE: 98.3 F | WEIGHT: 142 LBS | OXYGEN SATURATION: 94 % | SYSTOLIC BLOOD PRESSURE: 113 MMHG

## 2022-01-24 LAB
BASOPHILS # BLD: 0 K/UL (ref 0–0.1)
BASOPHILS NFR BLD: 0 % (ref 0–1)
DIFFERENTIAL METHOD BLD: ABNORMAL
EOSINOPHIL # BLD: 0 K/UL (ref 0–0.4)
EOSINOPHIL NFR BLD: 0 % (ref 0–7)
ERYTHROCYTE [DISTWIDTH] IN BLOOD BY AUTOMATED COUNT: 14.6 % (ref 11.5–14.5)
HCT VFR BLD AUTO: 43.8 % (ref 35–47)
HGB BLD-MCNC: 14.2 G/DL (ref 11.5–16)
IMM GRANULOCYTES # BLD AUTO: 0 K/UL
IMM GRANULOCYTES NFR BLD AUTO: 0 %
LYMPHOCYTES # BLD: 5.4 K/UL (ref 0.8–3.5)
LYMPHOCYTES NFR BLD: 35 % (ref 12–49)
MCH RBC QN AUTO: 28.7 PG (ref 26–34)
MCHC RBC AUTO-ENTMCNC: 32.4 G/DL (ref 30–36.5)
MCV RBC AUTO: 88.7 FL (ref 80–99)
METAMYELOCYTES NFR BLD MANUAL: 3 %
MONOCYTES # BLD: 0.2 K/UL (ref 0–1)
MONOCYTES NFR BLD: 1 % (ref 5–13)
MYELOCYTES NFR BLD MANUAL: 1 %
NEUTS BAND NFR BLD MANUAL: 1 % (ref 0–6)
NEUTS SEG # BLD: 9.2 K/UL (ref 1.8–8)
NEUTS SEG NFR BLD: 59 % (ref 32–75)
NRBC # BLD: 0 K/UL (ref 0–0.01)
NRBC BLD-RTO: 0 PER 100 WBC
PLATELET # BLD AUTO: 299 K/UL (ref 150–400)
PLATELET COMMENTS,PCOM: ABNORMAL
PMV BLD AUTO: 8.9 FL (ref 8.9–12.9)
RBC # BLD AUTO: 4.94 M/UL (ref 3.8–5.2)
RBC MORPH BLD: ABNORMAL
WBC # BLD AUTO: 15.3 K/UL (ref 3.6–11)
WBC MORPH BLD: ABNORMAL

## 2022-01-24 PROCEDURE — 74011250637 HC RX REV CODE- 250/637: Performed by: INTERNAL MEDICINE

## 2022-01-24 PROCEDURE — 94760 N-INVAS EAR/PLS OXIMETRY 1: CPT

## 2022-01-24 PROCEDURE — 85025 COMPLETE CBC W/AUTO DIFF WBC: CPT

## 2022-01-24 PROCEDURE — 74011250637 HC RX REV CODE- 250/637: Performed by: HOSPITALIST

## 2022-01-24 PROCEDURE — 74011000250 HC RX REV CODE- 250: Performed by: FAMILY MEDICINE

## 2022-01-24 PROCEDURE — 94640 AIRWAY INHALATION TREATMENT: CPT

## 2022-01-24 PROCEDURE — 36415 COLL VENOUS BLD VENIPUNCTURE: CPT

## 2022-01-24 PROCEDURE — 74011000250 HC RX REV CODE- 250: Performed by: INTERNAL MEDICINE

## 2022-01-24 PROCEDURE — 74011636637 HC RX REV CODE- 636/637: Performed by: HOSPITALIST

## 2022-01-24 PROCEDURE — 74011000250 HC RX REV CODE- 250: Performed by: HOSPITALIST

## 2022-01-24 PROCEDURE — 74011250637 HC RX REV CODE- 250/637: Performed by: NURSE PRACTITIONER

## 2022-01-24 RX ORDER — PREDNISONE 10 MG/1
TABLET ORAL
Qty: 21 TABLET | Refills: 0 | Status: SHIPPED | OUTPATIENT
Start: 2022-01-24

## 2022-01-24 RX ORDER — ALPRAZOLAM 0.25 MG/1
0.25 TABLET ORAL
Qty: 6 TABLET | Refills: 0 | Status: SHIPPED | OUTPATIENT
Start: 2022-01-24 | End: 2022-01-27

## 2022-01-24 RX ORDER — METOPROLOL TARTRATE 25 MG/1
25 TABLET, FILM COATED ORAL 2 TIMES DAILY
Qty: 60 TABLET | Refills: 0 | Status: SHIPPED | OUTPATIENT
Start: 2022-01-24

## 2022-01-24 RX ORDER — HYDROXYZINE 25 MG/1
25-50 TABLET, FILM COATED ORAL
Qty: 60 TABLET | Refills: 0 | Status: SHIPPED
Start: 2022-01-24

## 2022-01-24 RX ADMIN — CALCIUM CARBONATE (ANTACID) CHEW TAB 500 MG 200 MG: 500 CHEW TAB at 12:51

## 2022-01-24 RX ADMIN — FAMOTIDINE 10 MG: 20 TABLET ORAL at 09:12

## 2022-01-24 RX ADMIN — PANTOPRAZOLE SODIUM 40 MG: 40 TABLET, DELAYED RELEASE ORAL at 09:11

## 2022-01-24 RX ADMIN — SODIUM CHLORIDE, PRESERVATIVE FREE 10 ML: 5 INJECTION INTRAVENOUS at 06:24

## 2022-01-24 RX ADMIN — ARFORMOTEROL TARTRATE 15 MCG: 15 SOLUTION RESPIRATORY (INHALATION) at 09:48

## 2022-01-24 RX ADMIN — AZELASTINE HYDROCHLORIDE 1 SPRAY: 137 SPRAY, METERED NASAL at 09:11

## 2022-01-24 RX ADMIN — Medication 40 ML: at 14:23

## 2022-01-24 RX ADMIN — PREDNISONE 40 MG: 20 TABLET ORAL at 09:11

## 2022-01-24 RX ADMIN — IPRATROPIUM BROMIDE 0.5 MG: 0.5 SOLUTION RESPIRATORY (INHALATION) at 14:26

## 2022-01-24 RX ADMIN — BUDESONIDE 500 MCG: 0.5 INHALANT RESPIRATORY (INHALATION) at 09:48

## 2022-01-24 RX ADMIN — CALCIUM CARBONATE (ANTACID) CHEW TAB 500 MG 200 MG: 500 CHEW TAB at 09:13

## 2022-01-24 RX ADMIN — SODIUM CHLORIDE, PRESERVATIVE FREE 10 ML: 5 INJECTION INTRAVENOUS at 14:23

## 2022-01-24 RX ADMIN — SODIUM CHLORIDE, PRESERVATIVE FREE 10 ML: 5 INJECTION INTRAVENOUS at 00:29

## 2022-01-24 RX ADMIN — IPRATROPIUM BROMIDE 0.5 MG: 0.5 SOLUTION RESPIRATORY (INHALATION) at 09:48

## 2022-01-24 RX ADMIN — METOPROLOL TARTRATE 25 MG: 25 TABLET, FILM COATED ORAL at 12:50

## 2022-01-24 NOTE — PROGRESS NOTES
[address] [facility] Adult  Hospitalist Group                                                                                          Hospitalist Progress Note  Andreyradha Barrios MD  Answering service: [phone] OR [phone] from in house phone        Date of Service:  2022  NAME:  Rafiq Dumont  :  1985  MRN:  [id number]    This documentation was facilitated by a Voice Recognition software and may contain inadvertent typographical errors. 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 She never had childhood asthma. She has been suffering from frequent flareups requiring hospitalization, urgent care and PCP visit and receiving steroid use on top of her other respiratory therapeutics. She has been having respiratory difficulty and tachycardia since  with no relief despite high-dose steroid, bronchodilators inhaled steroids and allergy medications so she came to the ER on 2022. She is currently undergoing cardiac evaluation at [facility] referred by her pulmonologist for cardiac causes of her respiratory symptoms and tachycardia. Patient denied history of autoimmune disorders such as SLE, RA etc.    Interval history / Subjective:        Patient seen and examined for follow-up of tachycardia and possible asthma exacerbation. Patient felt well yesterday with Lopressor. However patient did not receive this morning for blood pressure under 110. Had heart rate running 130140s and not feeling well. Some heartburns as well. Agreeable to try metoprolol and GI cocktail and he feels fine to consider discharge later today. Subsequently, metoprolol given and subsequently heart rate better controlled.   GI cocktail worked very well and patient would like to have some at hand on discharge. Patient feels well to go home. Counseled about steroid taper, outpatient follow-up with primary care as well as pulmonology follow-up for further recommendations. Assessment & Plan:   Acute asthmatic exacerbation. She failed outpatient treatment. Has been having persistent symptoms since Thanksgiving.  --CTA of the lung on admission negative for PE, showed bandlike foci of atelectasis/scarring in the lung bases and trace pericardial effusion. Respiratory viral panel including SARS-CoV-2 PCR negative. Change IV Solu-Medrol to prednisone, continue inhaled steroid/LABA  --Wean prednisone with slow taper at home and further per PCP/pulmonology follow-up. Discussed at length. We will plan drop of 10 mg prednisone dosing after 3 doses. Defer further to pulmonology on follow-up  --Weaned O2 successfully. Feeling well. --EGD showed normal esophagus, stomach and duodenum, biopsies taken. D/W patient 1/24/2022, unremarkable  --On PPI and H2 blockers at home.  --GI follow-up with Dr. Tenisha Rob in 1-2 months outpatient  -- Hydroxyzine 25-50 mg 3 times daily as needed for anxiety. Currently patient has 50 mg 3 times daily as needed for itching as a home medication. Home medication instructions modified to include use for anxiety as needed  --Defer further long-term anxiolytic medication planning to PCP for evaluation options of SSRI/SNRI or other anxiolytic groups  -- Low-dose of Xanax as needed, short backup total 6 tablets prescription for 3 days as needed as 0.25 mg twice daily given if hydroxyzine does not work. Defer further to PCP.   --D/W patient for considering behavioral health consultation outpatient if warranted.   Defer to PCP  -- Would need outpatient follow-up with pulmonologist, PCP and likely Dr. Lizbet Phillips, GERD specialist as suggested by pulmonology here    Sinus tachycardia related to asthmatic exacerbation: Patient has had 10 tachycardia during flareups that slowly improve while in the acute attacks resolved. -- CTA lung showed trace pericardial effusion, will go ahead and obtain echocardiogram  -- Patient currently undergoing cardiac evaluation at [facility] to look into nonpulmonary causes for her respiratory symptoms  Echocardiogram did not show anything new, no pericardial effusion. -- Lopressor now at 25 mg twice daily, tolerating well. HR in 90100s. --Defer to PCP to monitor closely and consider further up titration to 37.5 mg twice daily for better HR control if BP allows. D/W patient is about the same, and to discuss further with PCP.  --Encourage patient to monitor BP at home closely. Okay to use for soft BP as well to maintain HR control as long as patient is not feeling symptomatic with dizziness, lightheadedness, passing out feeling, excessive tiredness or fatigue. Encourage patient to keep close follow-up with PCP for the same. --If needed, consider outpatient cardiology follow-up as appropriate    Leukocytosis, this is most probably due to steroid induced, patient not septic. Monitor    GERD: Patient uses both pantoprazole and famotidine. -GI cocktail worked well 1/24/2022 trial.  Prescription for the same given: Mix 30mL Mylanta and 10mL Lidocaine Viscous together = 40 mL GI Cocktail for each dose, total 800 mL for 20 doses as needed      Care Plan discussed with: Patient  Code status: Full code  DVT prophylaxis: Lovenox  Anticipated Disposition: DC patient home today. Hospital Problems  Date Reviewed: 12/8/2021          Codes Class Noted POA    SOB (shortness of breath) ICD-10-CM: R06.02  ICD-9-CM: 786.05  1/18/2022 Unknown                Review of Systems:   A comprehensive review of systems was negative except for that written in the HPI. Vital Signs:    Last 24hrs VS reviewed since prior progress note.  Most recent are:      No intake or output data in the 24 hours ending 01/24/22 8186     Physical Examination:     I had a face to face encounter with this patient and independently examined them on1/24/2022 as outlined below:        Constitutional:  Alert     HEENT:  Atraumatic. Oral mucosa moist,. Non icteric sclera. No pallor. Resp:  On room air, clear air entry bilaterally without wheezing or rhonchi or any other added sounds. Not using any accessory muscles at present   Chest Wall: No deformity   CV:  Regular rhythm, tachycardia + HR in 130140s without metoprolol, subsequently HR in 90100s with metoprolol, no murmurs, gallops, rubs    GI:  Normoactive  Soft, non distended, non tender. No appreciable organomegaly    :  No CVA or suprapubic tenderness    Musculoskeletal:  No edema  No deformity  ROM intact    Neurologic:  Mental status:AAOx3,   Cranial nerves II-XII : WNL  Motor exam:Moves all extremities symmetrically              Data Review:    Review and/or order of clinical lab test  Review and/or order of tests in the radiology section of CPT  Review and/or order of tests in the medicine section of CPT      Available Lab and Imaging results reviewed and used to formulate the current care plan. CTA CHEST W OR W WO CONT    Result Date: 1/18/2022  1. No pulmonary embolism nor other definite acute finding in the chest. 2.  Nonspecific trace pericardial effusion. 3.  Multiple indeterminate hypodense lesions in the liver not definitely seen on prior exam. Nonemergent liver mass protocol MRI can be obtained as an outpatient for further characterization    XR CHEST PORT    Result Date: 1/20/2022  New mild areas of discoid atelectasis are noted at the right lung base.     Recent Results (from the past 24 hour(s))   CBC WITH AUTOMATED DIFF    Collection Time: 01/24/22  8:16 AM   Result Value Ref Range    WBC 15.3 (H) 3.6 - 11.0 K/uL    RBC 4.94 3.80 - 5.20 M/uL    HGB 14.2 11.5 - 16.0 g/dL    HCT 43.8 35.0 - 47.0 %    MCV 88.7 80.0 - 99.0 FL    MCH 28.7 26.0 - 34.0 PG    MCHC 32.4 30.0 - 36.5 g/dL    RDW 14.6 (H) 11.5 - 14.5 %    PLATELET 948 853 - 231 K/uL    MPV 8.9 8.9 - 12.9 FL    NRBC 0.0 0  WBC    ABSOLUTE NRBC 0.00 0.00 - 0.01 K/uL    NEUTROPHILS 59 32 - 75 %    BAND NEUTROPHILS 1 0 - 6 %    LYMPHOCYTES 35 12 - 49 %    MONOCYTES 1 (L) 5 - 13 %    EOSINOPHILS 0 0 - 7 %    BASOPHILS 0 0 - 1 %    METAMYELOCYTES 3 (H) 0 %    MYELOCYTES 1 (H) 0 %    IMMATURE GRANULOCYTES 0 %    ABS. NEUTROPHILS 9.2 (H) 1.8 - 8.0 K/UL    ABS. LYMPHOCYTES 5.4 (H) 0.8 - 3.5 K/UL    ABS. MONOCYTES 0.2 0.0 - 1.0 K/UL    ABS. EOSINOPHILS 0.0 0.0 - 0.4 K/UL    ABS. BASOPHILS 0.0 0.0 - 0.1 K/UL    ABS. IMM.  GRANS. 0.0 K/UL    DF MANUAL      PLATELET COMMENTS CLUMPED PLATELETS      RBC COMMENTS ANISOCYTOSIS  1+        WBC COMMENTS ATYPICAL LYMPHOCYTES PRESENT           Medications Reviewed:     Current Facility-Administered Medications   Medication Dose Route Frequency    hydrOXYzine HCL (ATARAX) tablet 25-50 mg  25-50 mg Oral TID PRN    ipratropium (ATROVENT) 0.02 % nebulizer solution 0.5 mg  0.5 mg Nebulization Q6H RT    metoprolol tartrate (LOPRESSOR) tablet 25 mg  25 mg Oral BID    sodium chloride (NS) flush 5-40 mL  5-40 mL IntraVENous Q8H    sodium chloride (NS) flush 5-40 mL  5-40 mL IntraVENous PRN    predniSONE (DELTASONE) tablet 40 mg  40 mg Oral DAILY WITH BREAKFAST    ALPRAZolam (XANAX) tablet 0.25 mg  0.25 mg Oral TID PRN    melatonin tablet 3 mg  3 mg Oral QHS    calcium carbonate (TUMS) chewable tablet 200 mg [elemental]  200 mg Oral TID WITH MEALS    famotidine (PEPCID) tablet 10 mg  10 mg Oral DAILY    arformoteroL (BROVANA) neb solution 15 mcg  15 mcg Nebulization BID RT    And    budesonide (PULMICORT) 500 mcg/2 ml nebulizer suspension  500 mcg Nebulization BID RT    cetirizine-pseudoePHEDrine (ZyrTEC-D) 5-120 mg per tablet 1 Tablet (Patient Supplied)  1 Tablet Oral BID PRN    sodium chloride (NS) flush 5-40 mL  5-40 mL IntraVENous Q8H    sodium chloride (NS) flush 5-40 mL  5-40 mL IntraVENous PRN    acetaminophen (TYLENOL) tablet 650 mg  650 mg Oral Q4H PRN    ondansetron (ZOFRAN) injection 4 mg  4 mg IntraVENous Q4H PRN    montelukast (SINGULAIR) tablet 10 mg  10 mg Oral QHS    albuterol-ipratropium (DUO-NEB) 2.5 MG-0.5 MG/3 ML  3 mL Nebulization Q6H PRN    fluticasone propionate (FLONASE) 50 mcg/actuation nasal spray 2 Spray  2 Spray Both Nostrils DAILY    azelastine (ASTELIN) 137mcg/spray nasal spray  1 Spray Both Nostrils BID    pantoprazole (PROTONIX) tablet 40 mg  40 mg Oral BID     ______________________________________________________________________  EXPECTED LENGTH OF STAY: 3d 0h  ACTUAL LENGTH OF STAY:          6                 Rody Gan MD

## 2022-01-24 NOTE — DISCHARGE INSTRUCTIONS
Discharge Instructions       PATIENT ID: Bishop Jha  MRN: [id number]   YOB: 1985    DATE OF ADMISSION: 1/18/2022 10:45 AM    DATE OF DISCHARGE: 1/24/2022    PRIMARY CARE PROVIDER: Aruna Barron NP     ATTENDING PHYSICIAN: More Osullivan MD  DISCHARGING PROVIDER: Donato Costa MD    To contact this individual call [phone] and ask the  to page. If unavailable ask to be transferred the Adult Hospitalist Department. DISCHARGE DIAGNOSES   Acute asthmatic exacerbation. She failed outpatient treatment. Has been having persistent symptoms since Thanksgiving.  --CTA of the lung on admission negative for PE, showed bandlike foci of atelectasis/scarring in the lung bases and trace pericardial effusion. Respiratory viral panel including SARS-CoV-2 PCR negative. Change IV Solu-Medrol to prednisone, continue inhaled steroid/LABA  --Wean prednisone with slow taper at home and further per PCP/pulmonology follow-up. Discussed at length. We will plan drop of 10 mg prednisone dosing after 3 doses. Defer further to pulmonology on follow-up  --Weaned O2 successfully. Feeling well. --EGD showed normal esophagus, stomach and duodenum, biopsies taken. D/W patient 1/24/2022, unremarkable  --On PPI and H2 blockers at home.  --GI follow-up with Dr. Linnette Coleman in 1-2 months outpatient  -- Hydroxyzine 25-50 mg 3 times daily as needed for anxiety. Currently patient has 50 mg 3 times daily as needed for itching as a home medication. Home medication instructions modified to include use for anxiety as needed  --Defer further long-term anxiolytic medication planning to PCP for evaluation options of SSRI/SNRI or other anxiolytic groups  -- Low-dose of Xanax as needed, short backup total 6 tablets prescription for 3 days as needed as 0.25 mg twice daily given if hydroxyzine does not work.   Defer further to PCP.   --D/W patient for considering behavioral health consultation outpatient if warranted. Defer to PCP  -- Would need outpatient follow-up with pulmonologist, PCP and likely Dr. Damaris Hodges, GERD specialist as suggested by pulmonology here    Sinus tachycardia related to asthmatic exacerbation: Patient has had 10 tachycardia during flareups that slowly improve while in the acute attacks resolved. -- CTA lung showed trace pericardial effusion, will go ahead and obtain echocardiogram  -- Patient currently [facility] to look into nonpulmonary causes for her respiratory symptoms  Echocardiogram did not show anything new, no pericardial effusion. -- Lopressor now at 25 mg twice daily, tolerating well. HR in 90-100s. --Defer to PCP to monitor closely and consider further up titration to 37.5 mg twice daily for better HR control if BP allows. D/W patient is about the same, and to discuss further with PCP.  --Encourage patient to monitor BP at home closely. Okay to use for soft BP as well to maintain HR control as long as patient is not feeling symptomatic with dizziness, lightheadedness, passing out feeling, excessive tiredness or fatigue. Encourage patient to keep close follow-up with PCP for the same. --If needed, consider outpatient cardiology follow-up as appropriate    Leukocytosis, this is most probably due to steroid induced, patient not septic. Monitor    GERD: Patient uses both pantoprazole and famotidine. -GI cocktail worked well 1/24/2022 trial.  Prescription for the same given: Mix 30mL Mylanta and 10mL Lidocaine Viscous together = 40 mL GI Cocktail for each dose, total 800 mL for 20 doses as needed      Care Plan discussed with: Patient  Code status: Full code  DVT prophylaxis: Lovenox  Anticipated Disposition: DC patient home today.       CONSULTATIONS: IP CONSULT TO PULMONOLOGY  IP CONSULT TO PULMONOLOGY  IP CONSULT TO GASTROENTEROLOGY    PROCEDURES/SURGERIES: Procedure(s):  ESOPHAGOGASTRODUODENOSCOPY (EGD)  ESOPHAGOGASTRODUODENAL (EGD) BIOPSY    PENDING TEST RESULTS:   At the time of discharge the following test results are still pending: None    FOLLOW UP APPOINTMENTS:   Follow-up Information     Follow up With Specialties Details Why Contact Info    Magaly Peters MD Gastroenterology Schedule an appointment as soon as possible for a visit in 1 month GI: please call office to schedule follow up in 1-2 months with GI specialist [phone] [facility]      Medardo Subramanian NP Nurse Practitioner Schedule an appointment as soon as possible for a visit in 1 week PCP: Post hospital discharge follow-up within 1 week with CBC, CMP/BMP Katia Henson [phone]      Jacy Sanon MD Pulmonary Disease Schedule an appointment as soon as possible for a visit in 1 week Pulmonology: Please call your primary pulmonologist, Dr. Ruben Spaulding for follow-up in 1-2 weeks for further recommendations and help [address] [address] [phone]      Karalee Libman, MD General Surgery Schedule an appointment as soon as possible for a visit GERD specialist: As recommended by pulmonology team at [facility], As needed [address] [facility]  [phone]             ADDITIONAL CARE RECOMMENDATIONS:   Follow up as noted in the appointments. PCP f/up with CBC, CMP/BMP check in 1 week. Please call office of other specialists involved in your care for scheduling/ verifying appointments and for any Q/concerns. · It is important that you take the medication exactly as they are prescribed. · Keep your medication in the bottles provided by the pharmacist and keep a list of the medication names, dosages, and times to be taken in your wallet. · Do not take other medications without consulting your doctor.    · No drinking alcohol or driving car or operating machinery if you are on narcotic pain medications. Donot take sedating mediations if you are sleepy or confused. · Fall Precautions  · Keep Well Hydrated  · Report to your medical provider if you feel you have  developed allergies to medications  · Follow up with your PCP or Consultant for medication adjustments and refills  · Monitor for signs of fevers,chills,bleeding,chest pain and seek medical attention if you do so. DIET: Regular Diet    ACTIVITY: Activity as tolerated    WOUND CARE: NA    EQUIPMENT needed: NA      Radiology:  CTA CHEST W OR W WO CONT    Result Date: 1/18/2022  1. No pulmonary embolism nor other definite acute finding in the chest. 2.  Nonspecific trace pericardial effusion. 3.  Multiple indeterminate hypodense lesions in the liver not definitely seen on prior exam. Nonemergent liver mass protocol MRI can be obtained as an outpatient for further characterization    XR CHEST PORT    Result Date: 1/20/2022  New mild areas of discoid atelectasis are noted at the right lung base. Labs:  Recent Results (from the past 24 hour(s))   CBC WITH AUTOMATED DIFF    Collection Time: 01/24/22  8:16 AM   Result Value Ref Range    WBC 15.3 (H) 3.6 - 11.0 K/uL    RBC 4.94 3.80 - 5.20 M/uL    HGB 14.2 11.5 - 16.0 g/dL    HCT 43.8 35.0 - 47.0 %    MCV 88.7 80.0 - 99.0 FL    MCH 28.7 26.0 - 34.0 PG    MCHC 32.4 30.0 - 36.5 g/dL    RDW 14.6 (H) 11.5 - 14.5 %    PLATELET 680 692 - 566 K/uL    MPV 8.9 8.9 - 12.9 FL    NRBC 0.0 0  WBC    ABSOLUTE NRBC 0.00 0.00 - 0.01 K/uL    NEUTROPHILS 59 32 - 75 %    BAND NEUTROPHILS 1 0 - 6 %    LYMPHOCYTES 35 12 - 49 %    MONOCYTES 1 (L) 5 - 13 %    EOSINOPHILS 0 0 - 7 %    BASOPHILS 0 0 - 1 %    METAMYELOCYTES 3 (H) 0 %    MYELOCYTES 1 (H) 0 %    IMMATURE GRANULOCYTES 0 %    ABS. NEUTROPHILS 9.2 (H) 1.8 - 8.0 K/UL    ABS. LYMPHOCYTES 5.4 (H) 0.8 - 3.5 K/UL    ABS. MONOCYTES 0.2 0.0 - 1.0 K/UL    ABS. EOSINOPHILS 0.0 0.0 - 0.4 K/UL    ABS. BASOPHILS 0.0 0.0 - 0.1 K/UL    ABS. IMM. GRANS. 0.0 K/UL    DF MANUAL      PLATELET COMMENTS CLUMPED PLATELETS      RBC COMMENTS ANISOCYTOSIS  1+        WBC COMMENTS ATYPICAL LYMPHOCYTES PRESENT       Pathology: EGD biopsies 1/21/2022:  FINAL PATHOLOGIC DIAGNOSIS* * *     1. Duodenum, biopsy:        Small bowel mucosa with no significant abnormality   No villous blunting or increase in intraepithelial lymphocytes     2. Stomach, biopsy:        Gastric antral type mucosa with reactive gastropathy     3. Esophagus, biopsy:        Squamocolumnar mucosa with no significant abnormality      DISCHARGE MEDICATIONS:   See Medication Reconciliation Form    · It is important that you take the medication exactly as they are prescribed. · Keep your medication in the bottles provided by the pharmacist and keep a list of the medication names, dosages, and times to be taken in your wallet. · Do not take other medications without consulting your doctor. NOTIFY YOUR PHYSICIAN FOR ANY OF THE FOLLOWING:   Fever over 101 degrees for 24 hours. Chest pain, shortness of breath, fever, chills, nausea, vomiting, diarrhea, change in mentation, falling, weakness, bleeding. Severe pain or pain not relieved by medications. Or, any other signs or symptoms that you may have questions about. DISPOSITION:   x Home With:   OT  PT  HH  RN       SNF/Inpatient Rehab/LTAC    Independent/assisted living    Hospice    Other:     CDMP Checked:   Yes x     PROBLEM LIST Updated:  Yes x        My Medications      START taking these medications      Instructions Each Dose to Equal Morning Noon Evening Bedtime   ALPRAZolam 0.25 mg tablet  Commonly known as: XANAX    Your last dose was: Your next dose is: Take 1 Tablet by mouth two (2) times daily as needed for Anxiety for up to 3 days. Max Daily Amount: 0.5 mg. If anxiety is not controlled by hydroxyzine or other measures. 0.25 mg                 alum-mag hydroxide-simeth-lidocaine    Your last dose was:      Your next dose is: Take 40 mL by mouth every six to eight (6-8) hours as needed (Heart burns, reflux). Shake Well: For Oral Use; Mix 30mL Mylanta and 10mL Lidocaine Viscous together = 40 mL per dose GI Cocktail. For pharmacy: Please mix as above for each dose and prepare for 20 doses = 800mg (600ml Mylanta + 200ml Lidocaine Viscous)   40 mL                 metoprolol tartrate 25 mg tablet  Commonly known as: LOPRESSOR    Your last dose was: Your next dose is: Take 1 Tablet by mouth two (2) times a day. 25 mg                    CHANGE how you take these medications      Instructions Each Dose to Equal Morning Noon Evening Bedtime   hydrOXYzine HCL 25 mg tablet  Commonly known as: ATARAX  What changed:   · how much to take  · reasons to take this  · additional instructions    Your last dose was: Your next dose is: Take 1-2 Tablets by mouth three (3) times daily as needed for Itching or Anxiety. Patient already has this prescription. 25-50 mg                 predniSONE 10 mg tablet  Commonly known as: Hubert Hubbard  What changed: additional instructions    Your last dose was: Your next dose is: Take 40mg (4 tabs) daily for 3 days, then 30mg (3 tabs) daily for 3 days, then 20mg (2 tab) daily for 3 days, then 10mg (1 tab) daily for 3 days. Further advise as per Pulmonology team on outpatient follow up. CONTINUE taking these medications      Instructions Each Dose to Equal Morning Noon Evening Bedtime   albuterol sulfate 90 mcg/actuation breath activated inhaler  Commonly known as: PROAIR RESPICLICK    Your last dose was: Your next dose is: Take 2 Puffs by inhalation every four (4) hours as needed for Cough. 2 Puff                 albuterol-ipratropium 2.5 mg-0.5 mg/3 ml Nebu  Commonly known as: DUO-NEB    Your last dose was: Your next dose is:         3 mL by Nebulization route every six (6) hours as needed for Wheezing or Shortness of Breath. Indications: bronchi muscle spasm resulting from COPD   3 mL                 azelastine 205.5 mcg (0.15 %)  Commonly known as: ASTEPRO    Your last dose was: Your next dose is: 2 Sprays two (2) times a day. 2 Spray                 doxylamine succinate 25 mg tablet  Commonly known as: UNISOM    Your last dose was: Your next dose is: Take 25 mg by mouth nightly as needed. 25 mg                 famotidine 10 mg tablet  Commonly known as: PEPCID    Your last dose was: Your next dose is: Take 40 mg by mouth daily. 40 mg                 Mucinex -30 mg per tablet  Generic drug: guaiFENesin-dextromethorphan SR    Your last dose was: Your next dose is: Take 1 Tab by mouth two (2) times a day. 1 Tablet                 Nasacort 55 mcg nasal inhaler  Generic drug: triamcinolone    Your last dose was: Your next dose is: 2 Sprays daily. 2 Spray                 norgestimate-ethinyl estradioL 0.25-35 mg-mcg Tab  Commonly known as: [facility]    Your last dose was: Your next dose is: Take 1 Tab by mouth daily. 1 Tablet                 pantoprazole 40 mg tablet  Commonly known as: PROTONIX    Your last dose was: Your next dose is: Take 40 mg by mouth two (2) times a day. 40 mg                 Symbicort 160-4.5 mcg/actuation Hfaa  Generic drug: budesonide-formoteroL    Your last dose was: Your next dose is: Take 2 Puffs by inhalation two (2) times a day. 2 Puff                 Xyzal 5 mg tablet  Generic drug: levocetirizine    Your last dose was: Your next dose is: Take 5 mg by mouth daily as needed for Allergies. 5 mg                 zafirlukast 20 mg tablet  Commonly known as: ACCOLATE    Your last dose was: Your next dose is: Take 20 mg by mouth two (2) times a day.    20 mg                 ZyrTEC-D 5-120 mg Tb12  Generic drug: cetirizine-pseudoePHEDrine    Your last dose was: Your next dose is: Take 1 Tablet by mouth once over twenty-four (24) hours. 1 Tablet                       Where to Get Your Medications      These medications were sent to [facility], [address] [place] [address] [address]  [address]    Phone: [phone]   · ALPRAZolam 0.25 mg tablet  · metoprolol tartrate 25 mg tablet  · predniSONE 10 mg tablet     Information on where to get these meds will be given to you by the nurse or doctor.     Ask your nurse or doctor about these medications  · alum-mag hydroxide-simeth-lidocaine  · hydrOXYzine HCL 25 mg tablet         Signed:   Beth Minor MD  1/24/2022  4:44 PM

## 2022-01-24 NOTE — DISCHARGE SUMMARY
Discharge Summary       PATIENT ID: Corona Mccoy  MRN: [id number]   YOB: 1985    DATE OF ADMISSION: 1/18/2022 10:45 AM    DATE OF DISCHARGE: 01/24/22   PRIMARY CARE PROVIDER: Natty Chiang NP     ATTENDING PHYSICIAN: Dinah Lefort, MD  DISCHARGING PROVIDER: Dinah Lefort, MD    To contact this individual call [phone] and ask the  to page. If unavailable ask to be transferred the Adult Hospitalist Department. 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 She never had childhood asthma. She has been suffering from frequent flareups requiring hospitalization, urgent care and PCP visit and receiving steroid use on top of her other respiratory therapeutics. She has been having respiratory difficulty and tachycardia since Thanksgiving with no relief despite high-dose steroid, bronchodilators inhaled steroids and allergy medications so she came to the ER on 1/18/2022. She is currently undergoing cardiac evaluation at [address] [place] [address] referred by her pulmonologist for cardiac causes of her respiratory symptoms and tachycardia. Patient denied history of autoimmune disorders such as SLE, RA etc.     Interval history / Subjective:        Patient seen and examined for follow-up of tachycardia and possible asthma exacerbation.     Patient felt well yesterday with Lopressor. However patient did not receive this morning for blood pressure under 110. Had heart rate running 130140s and not feeling well. Some heartburns as well. Agreeable to try metoprolol and GI cocktail and he feels fine to consider discharge later today. Subsequently, metoprolol given and subsequently heart rate better controlled. GI cocktail worked very well and patient would like to have some at hand on discharge. Patient feels well to go home. Counseled about steroid taper, outpatient follow-up with primary care as well as pulmonology follow-up for further recommendations. DISCHARGE DIAGNOSES / PLAN:      Acute asthmatic exacerbation. She failed outpatient treatment. Has been having persistent symptoms since Thanksgiving.  --CTA of the lung on admission negative for PE, showed bandlike foci of atelectasis/scarring in the lung bases and trace pericardial effusion. Respiratory viral panel including SARS-CoV-2 PCR negative. Change IV Solu-Medrol to prednisone, continue inhaled steroid/LABA  --Wean prednisone with slow taper at home and further per PCP/pulmonology follow-up. Discussed at length. We will plan drop of 10 mg prednisone dosing after 3 doses. Defer further to pulmonology on follow-up  --Weaned O2 successfully. Feeling well. --EGD showed normal esophagus, stomach and duodenum, biopsies taken. D/W patient 1/24/2022, unremarkable  --On PPI and H2 blockers at home.  --GI follow-up with Dr. Susan Bernal in 1-2 months outpatient  -- Hydroxyzine 25-50 mg 3 times daily as needed for anxiety. Currently patient has 50 mg 3 times daily as needed for itching as a home medication.   Home medication instructions modified to include use for anxiety as needed  --Defer further long-term anxiolytic medication planning to PCP for evaluation options of SSRI/SNRI or other anxiolytic groups  -- Low-dose of Xanax as needed, short backup total 6 tablets prescription for 3 days as needed as 0.25 mg twice daily given if hydroxyzine does not work. Defer further to PCP.   --D/W patient for considering behavioral health consultation outpatient if warranted. Defer to PCP  -- Would need outpatient follow-up with pulmonologist, PCP and likely Dr. Tara Donovan, GERD specialist as suggested by pulmonology here     Sinus tachycardia related to asthmatic exacerbation: Patient has had 10 tachycardia during flareups that slowly improve while in the acute attacks resolved. -- CTA lung showed trace pericardial effusion, will go ahead and obtain echocardiogram  -- Patient currently undergoing cardiac evaluation at [facility] to look into nonpulmonary causes for her respiratory symptoms  Echocardiogram did not show anything new, no pericardial effusion. -- Lopressor now at 25 mg twice daily, tolerating well. HR in 90100s. --Defer to PCP to monitor closely and consider further up titration to 37.5 mg twice daily for better HR control if BP allows. D/W patient is about the same, and to discuss further with PCP.  --Encourage patient to monitor BP at home closely. Okay to use for soft BP as well to maintain HR control as long as patient is not feeling symptomatic with dizziness, lightheadedness, passing out feeling, excessive tiredness or fatigue. Encourage patient to keep close follow-up with PCP for the same. --If needed, consider outpatient cardiology follow-up as appropriate     Leukocytosis, this is most probably due to steroid induced, patient not septic. Monitor     GERD: Patient uses both pantoprazole and famotidine. -GI cocktail worked well 1/24/2022 trial.  Prescription for the same given: Mix 30mL Mylanta and 10mL Lidocaine Viscous together = 40 mL GI Cocktail for each dose, total 800 mL for 20 doses as needed        Care Plan discussed with: Patient  Code status: Full code  DVT prophylaxis: Lovenox  Anticipated Disposition: DC patient home today. BMI: Body mass index is 27.73 kg/m². . This patient: Meets criteria for overweight given BMI >/= 25 and < 30 due to excess calories/nutritional. Weight loss and lifestyle modifications should be encouraged as an outpatient. PENDING TEST RESULTS:   At the time of discharge the following test results are still pending: none     ADDITIONAL CARE RECOMMENDATIONS:   Follow up as noted in the appointments. PCP f/up with CBC, CMP/BMP check in 1 week. Please call office of other specialists involved in your care for scheduling/ verifying appointments and for any Q/concerns. · It is important that you take the medication exactly as they are prescribed. · Keep your medication in the bottles provided by the pharmacist and keep a list of the medication names, dosages, and times to be taken in your wallet. · Do not take other medications without consulting your doctor. · No drinking alcohol or driving car or operating machinery if you are on narcotic pain medications. Donot take sedating mediations if you are sleepy or confused. · Fall Precautions  · Keep Well Hydrated  · Report to your medical provider if you feel you have  developed allergies to medications  · Follow up with your PCP or Consultant for medication adjustments and refills  · Monitor for signs of fevers,chills,bleeding,chest pain and seek medical attention if you do so. NOTIFY YOUR PHYSICIAN FOR ANY OF THE FOLLOWING:   Fever over 101 degrees for 24 hours. Chest pain, shortness of breath, fever, chills, nausea, vomiting, diarrhea, change in mentation, falling, weakness, bleeding. Severe pain or pain not relieved by medications, as well as any other signs or symptoms that you may have questions about.     FOLLOW UP APPOINTMENTS:    Follow-up Information     Follow up With Specialties Details Why Contact Info    Bravo Kumar MD Gastroenterology Schedule an appointment as soon as possible for a visit in 1 month GI: please call office to schedule follow up in 1-2 months with GI specialist [id number]  [address]  [phone]      Jesse Gaston NP Nurse Practitioner Schedule an appointment as soon as possible for a visit in 1 week PCP: Post hospital discharge follow-up within 1 week with CBC, CMP/BMP Katia Henson [phone]      Brian Mondragon MD Pulmonary Disease Schedule an appointment as soon as possible for a visit in 1 week Pulmonology: Please call your primary pulmonologist, Dr. Janette Worthy for follow-up in 1-2 weeks for further recommendations and help [address] [facility] [phone]      Rikki Knight MD General Surgery Schedule an appointment as soon as possible for a visit GERD specialist: As recommended by pulmonology team at [facility], As needed [address] [facility]  [phone]               DIET: Regular Diet    ACTIVITY: Activity as tolerated    EQUIPMENT needed: NA    DISCHARGE MEDICATIONS:  Current Discharge Medication List      START taking these medications    Details   metoprolol tartrate (LOPRESSOR) 25 mg tablet Take 1 Tablet by mouth two (2) times a day. Qty: 60 Tablet, Refills: 0  Start date: 1/24/2022      ALPRAZolam (XANAX) 0.25 mg tablet Take 1 Tablet by mouth two (2) times daily as needed for Anxiety for up to 3 days. Max Daily Amount: 0.5 mg. If anxiety is not controlled by hydroxyzine or other measures. Qty: 6 Tablet, Refills: 0  Start date: 1/24/2022, End date: 1/27/2022    Associated Diagnoses: Anxiety      alum-mag hydroxide-simeth-lidocaine Take 40 mL by mouth every six to eight (6-8) hours as needed (Heart burns, reflux). Shake Well: For Oral Use; Mix 30mL Mylanta and 10mL Lidocaine Viscous together = 40 mL per dose GI Cocktail.   For pharmacy: Please mix as above for each dose and prepare for 20 doses = 800mg (600ml Mylanta + 200ml Lidocaine Viscous)  Qty: 800 mL, Refills: 0  Start date: 1/24/2022         CONTINUE these medications which have CHANGED    Details   predniSONE (DELTASONE) 10 mg tablet Take 40mg (4 tabs) daily for 3 days, then 30mg (3 tabs) daily for 3 days, then 20mg (2 tab) daily for 3 days, then 10mg (1 tab) daily for 3 days. Further advise as per Pulmonology team on outpatient follow up. Qty: 21 Tablet, Refills: 0  Start date: 1/24/2022      hydrOXYzine HCL (ATARAX) 25 mg tablet Take 1-2 Tablets by mouth three (3) times daily as needed for Itching or Anxiety. Patient already has this prescription. Qty: 60 Tablet, Refills: 0  Start date: 1/24/2022         CONTINUE these medications which have NOT CHANGED    Details   doxylamine succinate (UNISOM) 25 mg tablet Take 25 mg by mouth nightly as needed. levocetirizine (Xyzal) 5 mg tablet Take 5 mg by mouth daily as needed for Allergies. albuterol-ipratropium (DUO-NEB) 2.5 mg-0.5 mg/3 ml nebu 3 mL by Nebulization route every six (6) hours as needed for Wheezing or Shortness of Breath. Indications: bronchi muscle spasm resulting from COPD  Qty: 30 Nebule, Refills: 1      cetirizine-pseudoePHEDrine (ZyrTEC-D) 5-120 mg Tb12 Take 1 Tablet by mouth once over twenty-four (24) hours. azelastine (ASTEPRO) 205.5 mcg (0.15 %) 2 Sprays two (2) times a day. zafirlukast (ACCOLATE) 20 mg tablet Take 20 mg by mouth two (2) times a day. pantoprazole (PROTONIX) 40 mg tablet Take 40 mg by mouth two (2) times a day. famotidine (PEPCID) 10 mg tablet Take 40 mg by mouth daily. norgestimate-ethinyl estradiol (ORTHO-CYCLEN, SPRINTEC) 0.25-35 mg-mcg tab Take 1 Tab by mouth daily. triamcinolone (NASACORT) 55 mcg nasal inhaler 2 Sprays daily. budesonide-formoteroL (Symbicort) 160-4.5 mcg/actuation HFAA Take 2 Puffs by inhalation two (2) times a day. guaiFENesin-dextromethorphan SR (MUCINEX DM) 600-30 mg per tablet Take 1 Tab by mouth two (2) times a day.       albuterol sulfate 90 mcg/actuation aepb Take 2 Puffs by inhalation every four (4) hours as needed for Cough. Qty: 1 Inhaler, Refills: 0             DISPOSITION:  Visit Vitals  /71 (BP 1 Location: Right upper arm, BP Patient Position: At rest;Sitting)   Pulse (!) 114   Temp 98.3 °F (36.8 °C)   Resp 18   Ht 5' (1.524 m)   Wt 64.4 kg (142 lb)   SpO2 94%   Breastfeeding No   BMI 27.73 kg/m²       x  Home With:   OT  PT  HH  RN       Long term SNF/Inpatient Rehab    Independent/assisted living    Hospice    Other:       PATIENT CONDITION AT DISCHARGE:     Functional status    Poor     Deconditioned    x Independent      Cognition   x  Lucid     Forgetful     Dementia      Catheters/lines (plus indication)    [place]     PICC     PEG    x None      Code status    x Full code     DNR      PHYSICAL EXAMINATION AT DISCHARGE:  Visit Vitals  /71 (BP 1 Location: Right upper arm, BP Patient Position: At rest;Sitting)   Pulse (!) 114   Temp 98.3 °F (36.8 °C)   Resp 18   Ht 5' (1.524 m)   Wt 64.4 kg (142 lb)   SpO2 94%   Breastfeeding No   BMI 27.73 kg/m²       I had a face to face encounter with this patient and independently examined them on1/24/2022 as outlined below:                                                        Constitutional:  Alert      HEENT:  Atraumatic. Oral mucosa moist,. Non icteric sclera. No pallor. Resp:  On room air, clear air entry bilaterally without wheezing or rhonchi or any other added sounds. Not using any accessory muscles at present   Chest Wall: No deformity   CV:  Regular rhythm, tachycardia + HR in 130140s without metoprolol, subsequently HR in 90100s with metoprolol, no murmurs, gallops, rubs    GI:  Normoactive  Soft, non distended, non tender.    No appreciable organomegaly    :  No CVA or suprapubic tenderness    Musculoskeletal:  No edema  No deformity  ROM intact    Neurologic:  Mental status:AAOx3,   Cranial nerves II-XII : WNL  Motor exam:Moves all extremities symmetrically           CHRONIC MEDICAL DIAGNOSES:  Problem List as of 1/24/2022 Date Reviewed: 12/8/2021          Codes Class Noted - Resolved    SOB (shortness of breath) ICD-10-CM: R06.02  ICD-9-CM: 786.05  1/18/2022 - Present        Asthma exacerbation ICD-10-CM: J45.901  ICD-9-CM: 493.92  Unknown - Present        GERD (gastroesophageal reflux disease) ICD-10-CM: K21.9  ICD-9-CM: 530.81  Unknown - Present        Tachypnea ICD-10-CM: R06.82  ICD-9-CM: 786.06  12/6/2021 - Present        Dyspnea ICD-10-CM: R06.00  ICD-9-CM: 786.09  12/6/2021 - Present        Leukocytosis ICD-10-CM: D72.829  ICD-9-CM: 288.60  12/6/2021 - Present        Asthma ICD-10-CM: J45.909  ICD-9-CM: 493.90  12/6/2021 - Present            Radiology  CTA CHEST W OR W WO CONT    Result Date: 1/18/2022  1. No pulmonary embolism nor other definite acute finding in the chest. 2.  Nonspecific trace pericardial effusion. 3.  Multiple indeterminate hypodense lesions in the liver not definitely seen on prior exam. Nonemergent liver mass protocol MRI can be obtained as an outpatient for further characterization    XR CHEST PORT    Result Date: 1/20/2022  New mild areas of discoid atelectasis are noted at the right lung base. Labs:  Recent Results (from the past 24 hour(s))   CBC WITH AUTOMATED DIFF    Collection Time: 01/24/22  8:16 AM   Result Value Ref Range    WBC 15.3 (H) 3.6 - 11.0 K/uL    RBC 4.94 3.80 - 5.20 M/uL    HGB 14.2 11.5 - 16.0 g/dL    HCT 43.8 35.0 - 47.0 %    MCV 88.7 80.0 - 99.0 FL    MCH 28.7 26.0 - 34.0 PG    MCHC 32.4 30.0 - 36.5 g/dL    RDW 14.6 (H) 11.5 - 14.5 %    PLATELET 758 958 - 901 K/uL    MPV 8.9 8.9 - 12.9 FL    NRBC 0.0 0  WBC    ABSOLUTE NRBC 0.00 0.00 - 0.01 K/uL    NEUTROPHILS 59 32 - 75 %    BAND NEUTROPHILS 1 0 - 6 %    LYMPHOCYTES 35 12 - 49 %    MONOCYTES 1 (L) 5 - 13 %    EOSINOPHILS 0 0 - 7 %    BASOPHILS 0 0 - 1 %    METAMYELOCYTES 3 (H) 0 %    MYELOCYTES 1 (H) 0 %    IMMATURE GRANULOCYTES 0 %    ABS. NEUTROPHILS 9.2 (H) 1.8 - 8.0 K/UL    ABS.  LYMPHOCYTES 5.4 (H) 0.8 - 3.5 K/UL    ABS. MONOCYTES 0.2 0.0 - 1.0 K/UL    ABS. EOSINOPHILS 0.0 0.0 - 0.4 K/UL    ABS. BASOPHILS 0.0 0.0 - 0.1 K/UL    ABS. IMM. GRANS. 0.0 K/UL    DF MANUAL      PLATELET COMMENTS CLUMPED PLATELETS      RBC COMMENTS ANISOCYTOSIS  1+        WBC COMMENTS ATYPICAL LYMPHOCYTES PRESENT       Pathology: EGD biopsies 1/21/2022:  FINAL PATHOLOGIC DIAGNOSIS* * *     1. Duodenum, biopsy:        Small bowel mucosa with no significant abnormality   No villous blunting or increase in intraepithelial lymphocytes     2. Stomach, biopsy:        Gastric antral type mucosa with reactive gastropathy     3.  Esophagus, biopsy:        Squamocolumnar mucosa with no significant abnormality      Greater than 40 minutes were spent with the patient on counseling and coordination of care    Signed:   Jey Shafer MD  1/24/2022  4:58 PM

## 2022-01-24 NOTE — PROGRESS NOTES
Transition of Care Plan   RUR- 9% Moderate Risk   DISPOSITION: The disposition plan is home with family assistance.  Transport: Family    At this time patient does not have any CM needs.    Patient to discharge home when stable.     Croghan BEREKET Russo, LAVON, LMHP-e

## 2022-01-24 NOTE — PROGRESS NOTES
I have reviewed discharge instructions with the patient and spouse. The patient and spouse verbalized understanding. IV removed without complications. Telemetry removed.

## 2022-01-24 NOTE — PROGRESS NOTES
PCCM:    Now on room air. HR better    TOMMY negative  IgE 130  RF < 10    Plan:    AE of asthma  VCD ? ? Significant GERD   Continue to wean steroids    GERD control as directed.    Would recommend outpatient follow up with ENT for VCD question   Follow up with Dr Umesh Bosch in 1-2 wks as directed for continued work up   We will be available again to see if needed    Doris Levine PA-C

## (undated) DEVICE — FORCEPS BX L240CM JAW DIA2.8MM L CAP W/ NDL MIC MESH TOOTH

## (undated) DEVICE — TUBING HYDR IRR --